# Patient Record
Sex: MALE | Race: WHITE | Employment: FULL TIME | URBAN - METROPOLITAN AREA
[De-identification: names, ages, dates, MRNs, and addresses within clinical notes are randomized per-mention and may not be internally consistent; named-entity substitution may affect disease eponyms.]

---

## 2017-07-18 ENCOUNTER — APPOINTMENT (OUTPATIENT)
Dept: MRI IMAGING | Facility: CLINIC | Age: 52
End: 2017-07-18
Attending: EMERGENCY MEDICINE
Payer: COMMERCIAL

## 2017-07-18 ENCOUNTER — HOSPITAL ENCOUNTER (EMERGENCY)
Facility: CLINIC | Age: 52
Discharge: HOME | End: 2017-07-18
Attending: EMERGENCY MEDICINE | Admitting: EMERGENCY MEDICINE
Payer: COMMERCIAL

## 2017-07-18 ENCOUNTER — APPOINTMENT (OUTPATIENT)
Dept: CT IMAGING | Facility: CLINIC | Age: 52
End: 2017-07-18
Attending: EMERGENCY MEDICINE
Payer: COMMERCIAL

## 2017-07-18 VITALS
RESPIRATION RATE: 12 BRPM | SYSTOLIC BLOOD PRESSURE: 160 MMHG | OXYGEN SATURATION: 97 % | DIASTOLIC BLOOD PRESSURE: 97 MMHG | TEMPERATURE: 98.2 F

## 2017-07-18 DIAGNOSIS — R42 DIZZINESS: ICD-10-CM

## 2017-07-18 DIAGNOSIS — I15.9 SECONDARY HYPERTENSION: ICD-10-CM

## 2017-07-18 LAB
ALBUMIN SERPL-MCNC: 3.8 G/DL (ref 3.4–5)
ALBUMIN UR-MCNC: NEGATIVE MG/DL
ALP SERPL-CCNC: 97 U/L (ref 40–150)
ALT SERPL W P-5'-P-CCNC: 25 U/L (ref 0–70)
ANION GAP SERPL CALCULATED.3IONS-SCNC: 7 MMOL/L (ref 3–14)
APPEARANCE UR: CLEAR
APTT PPP: 26 SEC (ref 22–37)
AST SERPL W P-5'-P-CCNC: 17 U/L (ref 0–45)
BASOPHILS # BLD AUTO: 0.1 10E9/L (ref 0–0.2)
BASOPHILS NFR BLD AUTO: 1 %
BILIRUB SERPL-MCNC: 0.3 MG/DL (ref 0.2–1.3)
BILIRUB UR QL STRIP: NEGATIVE
BUN SERPL-MCNC: 18 MG/DL (ref 7–30)
CALCIUM SERPL-MCNC: 8.7 MG/DL (ref 8.5–10.1)
CHLORIDE SERPL-SCNC: 104 MMOL/L (ref 94–109)
CO2 SERPL-SCNC: 29 MMOL/L (ref 20–32)
COLOR UR AUTO: NORMAL
CREAT SERPL-MCNC: 1.42 MG/DL (ref 0.66–1.25)
DIFFERENTIAL METHOD BLD: NORMAL
EOSINOPHIL # BLD AUTO: 0.2 10E9/L (ref 0–0.7)
EOSINOPHIL NFR BLD AUTO: 3.1 %
ERYTHROCYTE [DISTWIDTH] IN BLOOD BY AUTOMATED COUNT: 12.2 % (ref 10–15)
GFR SERPL CREATININE-BSD FRML MDRD: 52 ML/MIN/1.7M2
GLUCOSE BLDC GLUCOMTR-MCNC: 138 MG/DL (ref 70–99)
GLUCOSE SERPL-MCNC: 115 MG/DL (ref 70–99)
GLUCOSE UR STRIP-MCNC: NEGATIVE MG/DL
HCT VFR BLD AUTO: 41.9 % (ref 40–53)
HGB BLD-MCNC: 14.3 G/DL (ref 13.3–17.7)
HGB UR QL STRIP: NEGATIVE
IMM GRANULOCYTES # BLD: 0 10E9/L (ref 0–0.4)
IMM GRANULOCYTES NFR BLD: 0 %
INR PPP: 0.88 (ref 0.86–1.14)
KETONES UR STRIP-MCNC: NEGATIVE MG/DL
LEUKOCYTE ESTERASE UR QL STRIP: NEGATIVE
LYMPHOCYTES # BLD AUTO: 1.1 10E9/L (ref 0.8–5.3)
LYMPHOCYTES NFR BLD AUTO: 20.8 %
MCH RBC QN AUTO: 29.8 PG (ref 26.5–33)
MCHC RBC AUTO-ENTMCNC: 34.1 G/DL (ref 31.5–36.5)
MCV RBC AUTO: 87 FL (ref 78–100)
MONOCYTES # BLD AUTO: 0.5 10E9/L (ref 0–1.3)
MONOCYTES NFR BLD AUTO: 9.8 %
NEUTROPHILS # BLD AUTO: 3.4 10E9/L (ref 1.6–8.3)
NEUTROPHILS NFR BLD AUTO: 65.3 %
NITRATE UR QL: NEGATIVE
PH UR STRIP: 7 PH (ref 5–7)
PLATELET # BLD AUTO: 155 10E9/L (ref 150–450)
POTASSIUM SERPL-SCNC: 3.7 MMOL/L (ref 3.4–5.3)
PROT SERPL-MCNC: 7.5 G/DL (ref 6.8–8.8)
RBC # BLD AUTO: 4.8 10E12/L (ref 4.4–5.9)
SODIUM SERPL-SCNC: 140 MMOL/L (ref 133–144)
SP GR UR STRIP: 1.01 (ref 1–1.03)
TROPONIN I SERPL-MCNC: NORMAL UG/L (ref 0–0.04)
TSH SERPL DL<=0.005 MIU/L-ACNC: 1.03 MU/L (ref 0.4–4)
URN SPEC COLLECT METH UR: NORMAL
UROBILINOGEN UR STRIP-MCNC: NORMAL MG/DL (ref 0–2)
WBC # BLD AUTO: 5.2 10E9/L (ref 4–11)

## 2017-07-18 PROCEDURE — 99285 EMERGENCY DEPT VISIT HI MDM: CPT | Mod: 25

## 2017-07-18 PROCEDURE — 25000128 H RX IP 250 OP 636: Performed by: EMERGENCY MEDICINE

## 2017-07-18 PROCEDURE — 70498 CT ANGIOGRAPHY NECK: CPT

## 2017-07-18 PROCEDURE — 96361 HYDRATE IV INFUSION ADD-ON: CPT

## 2017-07-18 PROCEDURE — 85730 THROMBOPLASTIN TIME PARTIAL: CPT | Performed by: EMERGENCY MEDICINE

## 2017-07-18 PROCEDURE — 84484 ASSAY OF TROPONIN QUANT: CPT | Performed by: EMERGENCY MEDICINE

## 2017-07-18 PROCEDURE — 70553 MRI BRAIN STEM W/O & W/DYE: CPT

## 2017-07-18 PROCEDURE — 93010 ELECTROCARDIOGRAM REPORT: CPT | Performed by: EMERGENCY MEDICINE

## 2017-07-18 PROCEDURE — 25000131 ZZH RX MED GY IP 250 OP 636 PS 637: Performed by: EMERGENCY MEDICINE

## 2017-07-18 PROCEDURE — 25000125 ZZHC RX 250: Performed by: EMERGENCY MEDICINE

## 2017-07-18 PROCEDURE — 99285 EMERGENCY DEPT VISIT HI MDM: CPT | Mod: 25 | Performed by: EMERGENCY MEDICINE

## 2017-07-18 PROCEDURE — 70450 CT HEAD/BRAIN W/O DYE: CPT | Mod: XS

## 2017-07-18 PROCEDURE — A9585 GADOBUTROL INJECTION: HCPCS | Performed by: EMERGENCY MEDICINE

## 2017-07-18 PROCEDURE — 81003 URINALYSIS AUTO W/O SCOPE: CPT | Performed by: EMERGENCY MEDICINE

## 2017-07-18 PROCEDURE — 84443 ASSAY THYROID STIM HORMONE: CPT | Performed by: EMERGENCY MEDICINE

## 2017-07-18 PROCEDURE — 93005 ELECTROCARDIOGRAM TRACING: CPT

## 2017-07-18 PROCEDURE — 85610 PROTHROMBIN TIME: CPT | Performed by: EMERGENCY MEDICINE

## 2017-07-18 PROCEDURE — 85025 COMPLETE CBC W/AUTO DIFF WBC: CPT | Performed by: EMERGENCY MEDICINE

## 2017-07-18 PROCEDURE — 96360 HYDRATION IV INFUSION INIT: CPT | Mod: 59

## 2017-07-18 PROCEDURE — 00000146 ZZHCL STATISTIC GLUCOSE BY METER IP

## 2017-07-18 PROCEDURE — 80053 COMPREHEN METABOLIC PANEL: CPT | Performed by: EMERGENCY MEDICINE

## 2017-07-18 RX ORDER — MECLIZINE HCL 12.5 MG 12.5 MG/1
25 TABLET ORAL 3 TIMES DAILY PRN
Qty: 30 TABLET | Refills: 0 | Status: SHIPPED | OUTPATIENT
Start: 2017-07-18 | End: 2022-05-04

## 2017-07-18 RX ORDER — DIAZEPAM 10 MG/2ML
2.5 INJECTION, SOLUTION INTRAMUSCULAR; INTRAVENOUS ONCE
Status: DISCONTINUED | OUTPATIENT
Start: 2017-07-18 | End: 2017-07-18

## 2017-07-18 RX ORDER — GADOBUTROL 604.72 MG/ML
0.1 INJECTION INTRAVENOUS ONCE
Status: COMPLETED | OUTPATIENT
Start: 2017-07-18 | End: 2017-07-18

## 2017-07-18 RX ORDER — MECLIZINE HYDROCHLORIDE 25 MG/1
25 TABLET ORAL ONCE
Status: COMPLETED | OUTPATIENT
Start: 2017-07-18 | End: 2017-07-18

## 2017-07-18 RX ORDER — MECLIZINE HCL 12.5 MG 12.5 MG/1
25 TABLET ORAL 3 TIMES DAILY PRN
Qty: 30 TABLET | Refills: 0 | Status: SHIPPED | OUTPATIENT
Start: 2017-07-18 | End: 2017-07-18

## 2017-07-18 RX ORDER — IOPAMIDOL 755 MG/ML
70 INJECTION, SOLUTION INTRAVASCULAR ONCE
Status: COMPLETED | OUTPATIENT
Start: 2017-07-18 | End: 2017-07-18

## 2017-07-18 RX ADMIN — SODIUM CHLORIDE 500 ML: 9 INJECTION, SOLUTION INTRAVENOUS at 15:47

## 2017-07-18 RX ADMIN — GADOBUTROL 8.6 ML: 604.72 INJECTION INTRAVENOUS at 19:00

## 2017-07-18 RX ADMIN — SODIUM CHLORIDE 100 ML: 9 INJECTION, SOLUTION INTRAVENOUS at 15:21

## 2017-07-18 RX ADMIN — MECLIZINE HYDROCHLORIDE 25 MG: 25 TABLET ORAL at 15:45

## 2017-07-18 RX ADMIN — IOPAMIDOL 70 ML: 755 INJECTION, SOLUTION INTRAVENOUS at 15:17

## 2017-07-18 ASSESSMENT — ENCOUNTER SYMPTOMS
NAUSEA: 1
DIZZINESS: 1

## 2017-07-18 NOTE — ED PROVIDER NOTES
"  History     Chief Complaint   Patient presents with     Dizziness     Feeling dizzy today, starting around 1245.  BP very high, pt had been eating DQ.  Pt feeling very tired.       HPI  Don Deluca is a 52 year old male who presents to the ED today for evaluation of dizziness. Patient states at 1245 today he had a sudden onset of dizziness associated with mild nausea. He reports upon standing he feels off balance and upon shutting his eyes while laying down he gets the \"bed spins\". No prior history of dizziness. When he moves his head he also experiences dizziness. Patient denies any numbness or weakness in any extremity. He does not have any visual changes or difficulty speaking. He has not had any chest pain or sob. He denies any  vomiting or abdominal pain.     Social History     Social History     Marital status: N/A     Spouse name: N/A     Number of children: N/A     Years of education: N/A     Occupational History     Not on file.     Social History Main Topics     Smoking status: Not on file     Smokeless tobacco: Not on file     Alcohol use Not on file     Drug use: Not on file     Sexual activity: Not on file     Other Topics Concern     Not on file     Social History Narrative       I have reviewed the Medications, Allergies, Past Medical and Surgical History, and Social History in the Epic system.    Allergies: No Known Allergies      No current facility-administered medications on file prior to encounter.   No current outpatient prescriptions on file prior to encounter.    There is no problem list on file for this patient.      No past surgical history on file.    Social History   Substance Use Topics     Smoking status: Not on file     Smokeless tobacco: Not on file     Alcohol use Not on file         There is no immunization history on file for this patient.    BMI: There is no height or weight on file to calculate BMI.        Review of Systems   Constitutional: Negative for chills and fever. "   HENT: Negative for congestion and trouble swallowing.    Gastrointestinal: Positive for nausea. Negative for abdominal pain and vomiting.   Genitourinary: Negative for decreased urine volume and dysuria.   Musculoskeletal: Positive for gait problem. Negative for back pain and neck pain.   Skin: Negative for rash.   Neurological: Positive for dizziness and light-headedness. Negative for tremors, seizures, syncope, facial asymmetry, speech difficulty, weakness, numbness and headaches.   Hematological: Does not bruise/bleed easily.   Psychiatric/Behavioral: Negative for confusion.   All other systems reviewed and are negative.    Physical Exam   BP: (!) 203/106  Heart Rate: 56  Temp: 98.2  F (36.8  C)  Resp: 16  SpO2: 97 %  Physical Exam   Constitutional: He appears well-developed and well-nourished. No distress.   Psychiatric: He has a normal mood and affect.   Nursing note and vitals reviewed.    HENT: Oral mucosa moist. No lesions. Tm's are clear B.   Eyes: PERRL. EOMI. Positive lateral nystagmus., left greater than right.   Neck: Supple, no posterior neck tenderness.   Pulmonary/Chest: Lungs are clear to auscultation bilaterally.  Cardiovascular: Heart is regular rate and rhythm. No murmur.  Abdomen: Soft, non-distended, non-tender.   Musculoskeletal: Moving all extremities well. No peripheral edema. NO calf tenderness.  Neurological: Alert. No focal neurologic deficit. Cranial nerves intact. No drift in any extremity. GCS 15. Argueta-pike maneuver is positive.   Skin: No rash.    ED Course     ED Course     Procedures             EKG Interpretation:      Interpreted by Don Reed  Rhythm: sinus bradycardia  Rate: Bradycardia  Axis: Normal  Ectopy: none  Conduction: normal  ST Segments/ T Waves: No ST-T wave changes  Q Waves: none  Comparison to prior: No old EKG available    Clinical Impression: sinus bradycardia                  Critical Care time:  none          [employer] Stroke  Scale  Exam Interval: Baseline   Score    Level of consciousness: (0)   Alert, keenly responsive    LOC questions: (0)   Answers both questions correctly    LOC commands: (0)   Performs both tasks correctly    Best gaze: (0)   Normal    Visual: (0)   No visual loss    Facial palsy: (0)   Normal symmetrical movements    Motor arm (left): (0)   No drift    Motor arm (right): (0)   No drift    Motor leg (left): (0)   No drift    Motor leg (right): (0)   No drift    Limb ataxia: (0)   Absent    Sensory: (0)   Normal- no sensory loss    Best language: (0)   Normal- no aphasia    Dysarthria: (0)   Normal    Extinction and inattention: (0)   No abnormality        Total Score:  0          Labs Ordered and Resulted from Time of ED Arrival Up to the Time of Departure from the ED   COMPREHENSIVE METABOLIC PANEL - Abnormal; Notable for the following:        Result Value    Glucose 115 (*)     Creatinine 1.42 (*)     GFR Estimate 52 (*)     All other components within normal limits   GLUCOSE BY METER - Abnormal; Notable for the following:     Glucose 138 (*)     All other components within normal limits   CBC WITH PLATELETS DIFFERENTIAL   INR   PARTIAL THROMBOPLASTIN TIME   TROPONIN I   URINE MACROSCOPIC WITH REFLEX TO MICRO   TSH WITH FREE T4 REFLEX     Results for orders placed or performed during the hospital encounter of 07/18/17   CT Head w/o Contrast    Narrative    CT HEAD W/O CONTRAST  7/18/2017 3:35 PM    HISTORY: Sudden onset of dizziness and nausea.    TECHNIQUE: Scans were obtained through the head without IV contrast.   Radiation dose for this scan was reduced using automated exposure  control, adjustment of the mA and/or kV according to patient size, or  iterative reconstruction technique.    COMPARISON: None.    FINDINGS: No hemorrhage, mass lesion, or focal area of acute  infarction identified. Paranasal sinuses are normal. No bony  abnormality.      Impression    IMPRESSION: Negative CT scan of the  "head.    CHAVA TURNER MD   CT Head Neck Angio w/o & w Contrast    Narrative    CT ANGIOGRAM OF THE HEAD AND NECK WITHOUT AND WITH CONTRAST  7/18/2017  3:36 PM     HISTORY: Sudden onset of dizziness associated with mild nausea. He  reports upon standing he feels off balance and upon shutting his eyes  while laying down he gets the \"bed spins\".     TECHNIQUE: Precontrast localizing scans were followed by CT  angiography with an injection of 70 mL Isovue 370 IV with scans  through the head and neck. Images were transferred to a separate 3-D  workstation where multiplanar reformations and 3-D images were  created. Estimates of carotid stenoses are made relative to the distal  internal carotid artery diameters except as noted. Radiation dose for  this scan was reduced using automated exposure control, adjustment of  the mA and/or kV according to patient size, or iterative  reconstruction technique.    COMPARISON: None.    CT HEAD FINDINGS: No contrast enhancing lesions. Cerebral blood flow  is grossly normal.    CT ANGIOGRAM HEAD FINDINGS: Arteries are widely patent with no  aneurysm, significant stenosis, occlusion or intraarterial thrombus.  Venous circulation is unremarkable.     CT ANGIOGRAM NECK FINDINGS:   Right carotid artery: No significant stenosis.      Left carotid artery: No significant stenosis.      Vertebral arteries: No significant stenosis.      Other findings: Partially cystic 1.2 cm diameter right thyroid nodule.      Impression    IMPRESSION:   1. Normal CT angiogram of the head and neck.  2. Right thyroid nodule. In a patient without known thyroid disease,  an incidental thyroid nodule without microcalcifications measuring  <1.0 cm in size in a patient <35 years old or <1.5 cm in a patient >35  years old is most likely benign and does not typically require  follow-up.    I agree with the preliminary report that was communicated to the  referring physician.    CHRISTIAN NARAYAN MD   MR Brain w/o & w " Contrast    Narrative    MRI BRAIN WITHOUT AND WITH CONTRAST  7/18/2017 7:01 PM    HISTORY: Lightheadedness, dizziness and ataxia. Hypertension. Symptoms  began at 12:30 PM today.     TECHNIQUE: Multiplanar, multisequence MRI of the brain without and  with 8.6 mL Gadavist.    COMPARISON: CT angiogram today.    FINDINGS: There are a few tiny foci of prolonged T2 relaxation in the  central and subcortical white matter of the posterior frontal lobes,  nonspecific as to etiology. Ventricles and subarachnoid spaces appear  normal. There is no evidence of hemorrhage, mass, acute infarct, or  anomaly. There are no gadolinium enhancing lesions.    The facial structures appear normal. The arteries at the base of the  brain and the dural venous sinuses appear patent.       Impression    IMPRESSION:  1. No evidence of acute infarct, hemorrhage or mass.  2. Nonspecific supratentorial white matter lesions mainly in the  posterior frontal lobes.      CHRISTIAN NARAYAN MD         Medications   0.9% sodium chloride BOLUS (0 mLs Intravenous Stopped 7/18/17 1819)   iopamidol (ISOVUE-370) solution 70 mL (70 mLs Intravenous Given 7/18/17 1517)   sodium chloride 0.9 % for CT scan flush dose 100 mL (100 mLs As instructed Given 7/18/17 1521)   meclizine (ANTIVERT) tablet 25 mg (25 mg Oral Given 7/18/17 1545)   gadobutrol (GADAVIST) injection 0.1 mL/kg (8.6 mLs Intravenous Given 7/18/17 1900)       2:49 PM Patient assessed.     Assessments & Plan (with Medical Decision Making)I was asked to evaluate for a code stroke , Patient's NIHSS score is 0 and this appears to be a positional vertigo so a code stroke was not called. A CT/CTA was obtained after patient received a fluid bolus . Labs and an ekg were obtained. Labs were without acute abnormality accept an increased creatinine which was baseline for the patient. Patient was given meclizine for his dizziness. CT/CTA were unremarkable accept a small thyroid nodule. Findings were discussed with  patient. His blood  Pressure was significantly elevated when he arrived but this has been gradually improving. Due to his mild previous ataxia I discussed MRI of the brain with the patient and he was in agreement with the plan. MRI was without significant abnormality. With nonspecific supratentorial white matter lesions mainly in the posterior frontal lobes which in discussion with the radiologist were insignificant. Patient was able to ambulate without difficulty . He feels comfortable going home at this time. He did not have any arrhythmias while in the ED. He will follow up with his primary tomorrow to have his BP rechecked and will return if symptoms worsen or new symptoms develop. I have considered both neurogenic and cardiogenic causes of dizziness.      I have reviewed the nursing notes.    I have reviewed the findings, diagnosis, plan and need for follow up with the patient.       Discharge Medication List as of 7/18/2017  8:16 PM      START taking these medications    Details   meclizine (ANTIVERT) 12.5 MG tablet Take 2 tablets (25 mg) by mouth 3 times daily as needed for dizziness, Disp-30 tablet, R-0, E-Prescribe             Final diagnoses:   Dizziness - possible vertigo   Secondary hypertension     This document serves as a record of the services and decisions personally performed and made by Don Reed MD. It was created on HIS/HER behalf by Ruby Jackson, a trained medical scribe. The creation of this document is based the provider's statements to the medical scribe.  Ruby Jackson 2:55 PM 7/18/2017    Provider:   The information in this document, created by the medical scribe for me, accurately reflects the services I personally performed and the decisions made by me. I have reviewed and approved this document for accuracy prior to leaving the patient care area.  Don Reed MD 2:55 PM 7/18/2017 7/18/2017   [facility] EMERGENCY DEPARTMENT     Don Reed,  MD  07/20/17 7582

## 2017-07-18 NOTE — ED AVS SNAPSHOT
[facility] Emergency Department    [address] [address]    Phone:  [phone]    Fax:  [phone]                                       Don Deluca   MRN: [id number]    Department:  [facility] Emergency Department   Date of Visit:  7/18/2017           After Visit Summary Signature Page     I have received my discharge instructions, and my questions have been answered. I have discussed any challenges I see with this plan with the nurse or doctor.    ..........................................................................................................................................  Patient/Patient Representative Signature      ..........................................................................................................................................  Patient Representative Print Name and Relationship to Patient    ..................................................               ................................................  Date                                            Time    ..........................................................................................................................................  Reviewed by Signature/Title    ...................................................              ..............................................  Date                                                            Time

## 2017-07-18 NOTE — LETTER
[facility] EMERGENCY DEPARTMENT  [address] [address]  [phone]    2017    Don Deluca  [address] [place] [address]  [phone] (home)     : 1965      To Whom it may concern:    Don Sandrine was seen in our Emergency Department today, 2017.  He should return to work on 2017.    Sincerely,        Don Reed

## 2017-07-18 NOTE — ED NOTES
"Discussed plan of care with pt, he does not want to \" wait around for MRI \" will inform Dr Reed of this.  "

## 2017-07-18 NOTE — ED NOTES
Pt presents to the ER w c/o dizziness, onset after lunch today.  It is worse with movement.  Pt denies HA and vision changes.  He has no weakness nor deficits and [place] stroke scale is negative.  Pt reports no CHI and is not on blood thinners.  He is nauseated with movement.     Pt reports he is on HCTZ for HTN but does not take it regularly.  He took med last night but missed several nights prior to that.

## 2017-07-18 NOTE — ED AVS SNAPSHOT
[facility] Emergency Department    [address] [facility] [address]    Phone:  [phone]    Fax:  [phone]                                       Don Deluca   MRN: [id number]    Department:  [facility] Department   Date of Visit:  7/18/2017           Patient Information     Date Of Birth          1965        Your diagnoses for this visit were:     Dizziness possible vertigo    Secondary hypertension        You were seen by Don Reed MD.      Follow-up Information     Follow up with Your primary care.    Why:  Tomorrow for recheck of your hypertension.        Follow up with [facility] Emergency [facility].    Specialty:  EMERGENCY MEDICINE    Why:  If symptoms worsen    Contact information:    [address] [place] [id number] [phone]    Additional information:    The medical center is located at   [address] [facility]. (between [address] and   [address] in [place], [place] miles north   of [place]).        Discharge Instructions         Return if symptoms worsen or new symptoms develop.  Follow up with her primary care physician for recheck of your blood pressure tomorrow.  Continue blood pressure medications.  Take meclizine as directed.  If any further dizziness weakness numbness visual changes or other symptoms present please return for recheck.  Dizziness (Vertigo) and Balance Problems: Diagnostic Tests    An otolaryngologist (also called an ENT) is a doctor who specializes in disorders of the ear, nose, and throat. Your ENT can help find clues to the cause of your dizziness. He or she will examine you and go over your health history. Your ENT may also order certain tests to help diagnose your problem.  Hearing testing  In most cases, you will be referred for hearing testing. This is because the nerve that sends balance signals also sends hearing signals. A problem that affects balance can also affect hearing.  Other tests  Your  doctor may recommend more than one kind of test. The following tests are painless, but may cause dizziness in some cases.    MRI creates images of the ear or head. A magnetic field and contrast medium are used to make the image.    Electronystagmography (ENG) records eye movement. Small electrodes are put on the skin around your eyes. Then your ear is filled with warm or cold water.    Rotation tests show the relationship between the inner ear and your eyes. You may be asked to wear special goggles or sit in a computerized chair.    Posturography tests your standing balance under different conditions. You will stand on a platform that measures shifts in your body weight.     Electrocochleography (ECoG) measures the fluid pressure in the inner ear. An abnormal ECoG may mean you have Meniere's disease or other conditions.    Vestibular evoked myogenic potentials (VEMPs) may be used if your healthcare provider suspects a rare condition like superior semicircular canal dehiscence. Electrodes are placed on your neck, and you hear clicks in your ear.  Date Last Reviewed: 11/1/2016 2000-2017 [employer]. [address] [place] [address]. All rights reserved. This information is not intended as a substitute for professional medical care. Always follow your healthcare professional's instructions.          Dizziness (Vertigo) and Balance Problems: Staying Safe     Replace burned-out lightbulbs to keep your home safe and well lit.   Falls or accidents can lead to pain, broken bones, and fear of future falls. Protect yourself and others by preparing for episodes. Simple steps can help you stay safe at home and wherever you go.  Lighting  Keep all areas well lit. This helps your eyes send the right signals to the brain. It also makes you less likely to trip and fall. If bright lights make symptoms worse, dim the lights or lie in a dark room until the dizziness passes. Then turn the lights back to their  normal level.  Tips:    Keep a flashlight by the bed.    Place nightlights in bathrooms and hallways.    Replace burned-out bulbs, or have someone replace them for you.  Preventing falls  To reduce your risk of falling:    Get out of bed or up from a chair slowly.    Wear low-heeled shoes that fit properly and have slip-resistant soles.    Remove throw rugs. Clear clutter from walkways.    Use handrails on stairs. Have handrails installed or adjusted if needed.    Install grab bars in the bathroom. Don't use towel racks for balance.    Use a shower stool. Also put adhesive strips in the shower or on the tub floor.  Going out  With a little time and preparation, you can get around safely.  Tips:    Bring a cane or walking aid if needed.    Give yourself plenty of time in case you start to get dizzy.    Ask your healthcare provider what type of exercise is safe for your condition.    Be patient. If an activity such as walking through a crowded shop causes you stress, you may not be ready for it yet.  Driving  If you become dizzy or disoriented while driving, you could hurt yourself and others. That's why it's best to not drive until symptoms have gone away. In some cases, your license may be temporarily held until it's safe for you to drive again.  For safety:    Ask a friend to drive for you.    Take public transportation.    Walk to stores and other places when you can.  Asking for help  Don't be afraid to ask for help running errands, cooking meals, and doing exercise. Whether it's a friend, loved one, neighbor, or stranger on the street, a little help can make a world of difference.   Date Last Reviewed: 11/1/2016 2000-2017 [employer]. [address], [place] [address]. All rights reserved. This information is not intended as a substitute for professional medical care. Always follow your healthcare professional's instructions.          24 Hour Appointment Hotline       To make an  appointment at any [facility], call [phone] ([phone]). If you don't have a family doctor or clinic, we will help you find one. [facility] are conveniently located to serve the needs of you and your family.             Review of your medicines      START taking        Dose / Directions Last dose taken    meclizine 12.5 MG tablet   Commonly known as:  ANTIVERT   Dose:  25 mg   Quantity:  30 tablet        Take 2 tablets (25 mg) by mouth 3 times daily as needed for dizziness   Refills:  0          Our records show that you are taking the medicines listed below. If these are incorrect, please call your family doctor or clinic.        Dose / Directions Last dose taken    HYDROCHLOROTHIAZIDE PO        Take by mouth daily   Refills:  0        SERTRALINE HCL PO   Dose:  150 mg        Take 150 mg by mouth every evening   Refills:  0                Prescriptions were sent or printed at these locations (1 Prescription)                   [id number] - [place], [place] - [address] [facility],    [address] [facility], , [facility] [address]    Telephone:  [phone]   Fax:  [phone]   Hours:                  E-Prescribed (1 of 1)         meclizine (ANTIVERT) 12.5 MG tablet                Procedures and tests performed during your visit     CBC with platelets differential    CT Head Neck Angio w/o & w Contrast    CT Head w/o Contrast    Comprehensive metabolic panel    EKG 12 lead    Glucose by meter    INR    MR Brain w/o & w Contrast    Partial thromboplastin time    TSH with free T4 reflex    Troponin I    UA reflex to Microscopic      Orders Needing Specimen Collection     None      Pending Results     No orders found from 7/16/2017 to 7/19/2017.            Pending Culture Results     No orders found from 7/16/2017 to 7/19/2017.            Pending Results Instructions     If you had any lab results that were not finalized at the time of your Discharge, you can call the ED Lab Result RN  "at [phone]. You will be contacted by this team for any positive Lab results or changes in treatment. The nurses are available 7 days a week from 10A to 6:30P.  You can leave a message 24 hours per day and they will return your call.        Test Results From Your Hospital Stay        7/18/2017  3:47 PM      Narrative     CT HEAD W/O CONTRAST  7/18/2017 3:35 PM    HISTORY: Sudden onset of dizziness and nausea.    TECHNIQUE: Scans were obtained through the head without IV contrast.   Radiation dose for this scan was reduced using automated exposure  control, adjustment of the mA and/or kV according to patient size, or  iterative reconstruction technique.    COMPARISON: None.    FINDINGS: No hemorrhage, mass lesion, or focal area of acute  infarction identified. Paranasal sinuses are normal. No bony  abnormality.        Impression     IMPRESSION: Negative CT scan of the head.    CHAVA TURNER MD         7/18/2017  8:11 PM      Narrative     CT ANGIOGRAM OF THE HEAD AND NECK WITHOUT AND WITH CONTRAST  7/18/2017  3:36 PM     HISTORY: Sudden onset of dizziness associated with mild nausea. He  reports upon standing he feels off balance and upon shutting his eyes  while laying down he gets the \"bed spins\".     TECHNIQUE: Precontrast localizing scans were followed by CT  angiography with an injection of 70 mL Isovue 370 IV with scans  through the head and neck. Images were transferred to a separate 3-D  workstation where multiplanar reformations and 3-D images were  created. Estimates of carotid stenoses are made relative to the distal  internal carotid artery diameters except as noted. Radiation dose for  this scan was reduced using automated exposure control, adjustment of  the mA and/or kV according to patient size, or iterative  reconstruction technique.    COMPARISON: None.    CT HEAD FINDINGS: No contrast enhancing lesions. Cerebral blood flow  is grossly normal.    CT ANGIOGRAM HEAD FINDINGS: Arteries are " widely patent with no  aneurysm, significant stenosis, occlusion or intraarterial thrombus.  Venous circulation is unremarkable.     CT ANGIOGRAM NECK FINDINGS:   Right carotid artery: No significant stenosis.      Left carotid artery: No significant stenosis.      Vertebral arteries: No significant stenosis.      Other findings: Partially cystic 1.2 cm diameter right thyroid nodule.        Impression     IMPRESSION:   1. Normal CT angiogram of the head and neck.  2. Right thyroid nodule. In a patient without known thyroid disease,  an incidental thyroid nodule without microcalcifications measuring  <1.0 cm in size in a patient <35 years old or <1.5 cm in a patient >35  years old is most likely benign and does not typically require  follow-up.    I agree with the preliminary report that was communicated to the  referring physician.    CHRISTIAN NARAYAN MD         7/18/2017  3:44 PM      Component Results     Component Value Ref Range & Units Status    WBC 5.2 4.0 - 11.0 10e9/L Final    RBC Count 4.80 4.4 - 5.9 10e12/L Final    Hemoglobin 14.3 13.3 - 17.7 g/dL Final    Hematocrit 41.9 40.0 - 53.0 % Final    MCV 87 78 - 100 fl Final    MCH 29.8 26.5 - 33.0 pg Final    MCHC 34.1 31.5 - 36.5 g/dL Final    RDW 12.2 10.0 - 15.0 % Final    Platelet Count 155 150 - 450 10e9/L Final    Diff Method Automated Method  Final    % Neutrophils 65.3 % Final    % Lymphocytes 20.8 % Final    % Monocytes 9.8 % Final    % Eosinophils 3.1 % Final    % Basophils 1.0 % Final    % Immature Granulocytes 0.0 % Final    Absolute Neutrophil 3.4 1.6 - 8.3 10e9/L Final    Absolute Lymphocytes 1.1 0.8 - 5.3 10e9/L Final    Absolute Monocytes 0.5 0.0 - 1.3 10e9/L Final    Absolute Eosinophils 0.2 0.0 - 0.7 10e9/L Final    Absolute Basophils 0.1 0.0 - 0.2 10e9/L Final    Abs Immature Granulocytes 0.0 0 - 0.4 10e9/L Final         7/18/2017  3:57 PM      Component Results     Component Value Ref Range & Units Status    Sodium 140 133 - 144 mmol/L Final     Potassium 3.7 3.4 - 5.3 mmol/L Final    Chloride 104 94 - 109 mmol/L Final    Carbon Dioxide 29 20 - 32 mmol/L Final    Anion Gap 7 3 - 14 mmol/L Final    Glucose 115 (H) 70 - 99 mg/dL Final    Urea Nitrogen 18 7 - 30 mg/dL Final    Creatinine 1.42 (H) 0.66 - 1.25 mg/dL Final    GFR Estimate 52 (L) >60 mL/min/1.7m2 Final    Non  GFR Calc    GFR Estimate If Black 63 >60 mL/min/1.7m2 Final    African American GFR Calc    Calcium 8.7 8.5 - 10.1 mg/dL Final    Bilirubin Total 0.3 0.2 - 1.3 mg/dL Final    Albumin 3.8 3.4 - 5.0 g/dL Final    Protein Total 7.5 6.8 - 8.8 g/dL Final    Alkaline Phosphatase 97 40 - 150 U/L Final    ALT 25 0 - 70 U/L Final    AST 17 0 - 45 U/L Final         7/18/2017  4:34 PM      Component Results     Component Value Ref Range & Units Status    INR 0.88 0.86 - 1.14 Final         7/18/2017  4:34 PM      Component Results     Component Value Ref Range & Units Status    PTT 26 22 - 37 sec Final         7/18/2017  3:57 PM      Component Results     Component Value Ref Range & Units Status    Troponin I ES  0.000 - 0.045 ug/L Final    <0.015  The 99th percentile for upper reference range is 0.045 ug/L.  Troponin values in   the range of 0.045 - 0.120 ug/L may be associated with risks of adverse   clinical events.           7/18/2017  5:14 PM      Component Results     Component Value Ref Range & Units Status    Color Urine Straw  Final    Appearance Urine Clear  Final    Glucose Urine Negative NEG mg/dL Final    Bilirubin Urine Negative NEG Final    Ketones Urine Negative NEG mg/dL Final    Specific Gravity Urine 1.012 1.003 - 1.035 Final    Blood Urine Negative NEG Final    pH Urine 7.0 5.0 - 7.0 pH Final    Protein Albumin Urine Negative NEG mg/dL Final    Urobilinogen mg/dL Normal 0.0 - 2.0 mg/dL Final    Nitrite Urine Negative NEG Final    Leukocyte Esterase Urine Negative NEG Final    Source Midstream Urine  Final         7/18/2017  3:55 PM      Component Results      "Component Value Ref Range & Units Status    Glucose 138 (H) 70 - 99 mg/dL Final         7/18/2017  5:11 PM      Component Results     Component Value Ref Range & Units Status    TSH 1.03 0.40 - 4.00 mU/L Final         7/18/2017  8:10 PM      Narrative     MRI BRAIN WITHOUT AND WITH CONTRAST  7/18/2017 7:01 PM    HISTORY: Lightheadedness, dizziness and ataxia. Hypertension. Symptoms  began at 12:30 PM today.     TECHNIQUE: Multiplanar, multisequence MRI of the brain without and  with 8.6 mL Gadavist.    COMPARISON: CT angiogram today.    FINDINGS: There are a few tiny foci of prolonged T2 relaxation in the  central and subcortical white matter of the posterior frontal lobes,  nonspecific as to etiology. Ventricles and subarachnoid spaces appear  normal. There is no evidence of hemorrhage, mass, acute infarct, or  anomaly. There are no gadolinium enhancing lesions.    The facial structures appear normal. The arteries at the base of the  brain and the dural venous sinuses appear patent.         Impression     IMPRESSION:  1. No evidence of acute infarct, hemorrhage or mass.  2. Nonspecific supratentorial white matter lesions mainly in the  posterior frontal lobes.      CHRISTIAN NARAYAN MD                Thank you for choosing [facility]       Thank you for choosing [facility] for your care. Our goal is always to provide you with excellent care. Hearing back from our patients is one way we can continue to improve our services. Please take a few minutes to complete the written survey that you may receive in the mail after you visit with us. Thank you!        [email or web address] Information     [employer] lets you send messages to your doctor, view your test results, renew your prescriptions, schedule appointments and more. To sign up, go to [email or web address] . Click on \"Log in\" on the left side of the screen, which will take you to the Welcome page. Then click on \"Sign up Now\" on the right side of the page.     You will be asked to " enter the access code listed below, as well as some personal information. Please follow the directions to create your username and password.     Your access code is: [email or web address]  Expires: 10/16/2017  8:16 PM     Your access code will  in 90 days. If you need help or a new code, please call your [facility] clinic or [phone].        Care EveryWhere ID     This is your Care EveryWhere ID. This could be used by other organizations to access your [facility] medical records  [id number]        Equal Access to Services     [facility]: Haduna Yee, jessica carr, beronica kahnaldavid lindsey, nito woodson . So [facility] [phone].    ATENCIÓN: Si habla español, tiene a salvador disposición servicios gratuitos de asistencia lingüística. Llame al [phone].    We comply with applicable federal civil rights laws and [place] laws. We do not discriminate on the basis of race, color, national origin, age, disability sex, sexual orientation or gender identity.            After Visit Summary       This is your record. Keep this with you and show to your community pharmacist(s) and doctor(s) at your next visit.

## 2017-07-19 NOTE — DISCHARGE INSTRUCTIONS
Return if symptoms worsen or new symptoms develop.  Follow up with her primary care physician for recheck of your blood pressure tomorrow.  Continue blood pressure medications.  Take meclizine as directed.  If any further dizziness weakness numbness visual changes or other symptoms present please return for recheck.  Dizziness (Vertigo) and Balance Problems: Diagnostic Tests    An otolaryngologist (also called an ENT) is a doctor who specializes in disorders of the ear, nose, and throat. Your ENT can help find clues to the cause of your dizziness. He or she will examine you and go over your health history. Your ENT may also order certain tests to help diagnose your problem.  Hearing testing  In most cases, you will be referred for hearing testing. This is because the nerve that sends balance signals also sends hearing signals. A problem that affects balance can also affect hearing.  Other tests  Your doctor may recommend more than one kind of test. The following tests are painless, but may cause dizziness in some cases.    MRI creates images of the ear or head. A magnetic field and contrast medium are used to make the image.    Electronystagmography (ENG) records eye movement. Small electrodes are put on the skin around your eyes. Then your ear is filled with warm or cold water.    Rotation tests show the relationship between the inner ear and your eyes. You may be asked to wear special goggles or sit in a computerized chair.    Posturography tests your standing balance under different conditions. You will stand on a platform that measures shifts in your body weight.     Electrocochleography (ECoG) measures the fluid pressure in the inner ear. An abnormal ECoG may mean you have Meniere's disease or other conditions.    Vestibular evoked myogenic potentials (VEMPs) may be used if your healthcare provider suspects a rare condition like superior semicircular canal dehiscence. Electrodes are placed on your neck, and  you hear clicks in your ear.  Date Last Reviewed: 11/1/2016 2000-2017 [employer]. [address] [place] [address], [place] [address]. All rights reserved. This information is not intended as a substitute for professional medical care. Always follow your healthcare professional's instructions.          Dizziness (Vertigo) and Balance Problems: Staying Safe     Replace burned-out lightbulbs to keep your home safe and well lit.   Falls or accidents can lead to pain, broken bones, and fear of future falls. Protect yourself and others by preparing for episodes. Simple steps can help you stay safe at home and wherever you go.  Lighting  Keep all areas well lit. This helps your eyes send the right signals to the brain. It also makes you less likely to trip and fall. If bright lights make symptoms worse, dim the lights or lie in a dark room until the dizziness passes. Then turn the lights back to their normal level.  Tips:    Keep a flashlight by the bed.    Place nightlights in bathrooms and hallways.    Replace burned-out bulbs, or have someone replace them for you.  Preventing falls  To reduce your risk of falling:    Get out of bed or up from a chair slowly.    Wear low-heeled shoes that fit properly and have slip-resistant soles.    Remove throw rugs. Clear clutter from walkways.    Use handrails on stairs. Have handrails installed or adjusted if needed.    Install grab bars in the bathroom. Don't use towel racks for balance.    Use a shower stool. Also put adhesive strips in the shower or on the tub floor.  Going out  With a little time and preparation, you can get around safely.  Tips:    Bring a cane or walking aid if needed.    Give yourself plenty of time in case you start to get dizzy.    Ask your healthcare provider what type of exercise is safe for your condition.    Be patient. If an activity such as walking through a crowded shop causes you stress, you may not be ready for it yet.  Driving  If you  become dizzy or disoriented while driving, you could hurt yourself and others. That's why it's best to not drive until symptoms have gone away. In some cases, your license may be temporarily held until it's safe for you to drive again.  For safety:    Ask a friend to drive for you.    Take public transportation.    Walk to stores and other places when you can.  Asking for help  Don't be afraid to ask for help running errands, cooking meals, and doing exercise. Whether it's a friend, loved one, neighbor, or stranger on the street, a little help can make a world of difference.   Date Last Reviewed: 11/1/2016 2000-2017 [employer]. [address] [place] [address], [place] [address]. All rights reserved. This information is not intended as a substitute for professional medical care. Always follow your healthcare professional's instructions.

## 2017-07-20 ASSESSMENT — ENCOUNTER SYMPTOMS
FEVER: 0
ABDOMINAL PAIN: 0
SEIZURES: 0
BACK PAIN: 0
DYSURIA: 0
CONFUSION: 0
WEAKNESS: 0
FACIAL ASYMMETRY: 0
NECK PAIN: 0
LIGHT-HEADEDNESS: 1
BRUISES/BLEEDS EASILY: 0
TROUBLE SWALLOWING: 0
TREMORS: 0
HEADACHES: 0
SPEECH DIFFICULTY: 0
NUMBNESS: 0
CHILLS: 0
VOMITING: 0

## 2022-03-09 ENCOUNTER — TRANSFERRED RECORDS (OUTPATIENT)
Dept: HEALTH INFORMATION MANAGEMENT | Facility: CLINIC | Age: 57
End: 2022-03-09
Payer: COMMERCIAL

## 2022-05-04 ENCOUNTER — OFFICE VISIT (OUTPATIENT)
Dept: OTOLARYNGOLOGY | Facility: CLINIC | Age: 57
End: 2022-05-04
Payer: COMMERCIAL

## 2022-05-04 VITALS
HEIGHT: 70 IN | HEART RATE: 54 BPM | SYSTOLIC BLOOD PRESSURE: 149 MMHG | BODY MASS INDEX: 25.77 KG/M2 | DIASTOLIC BLOOD PRESSURE: 92 MMHG | TEMPERATURE: 97.6 F | WEIGHT: 180 LBS

## 2022-05-04 DIAGNOSIS — G47.33 MODERATE OBSTRUCTIVE SLEEP APNEA: Primary | ICD-10-CM

## 2022-05-04 DIAGNOSIS — Z78.9 INTOLERANCE OF CONTINUOUS POSITIVE AIRWAY PRESSURE (CPAP) VENTILATION: ICD-10-CM

## 2022-05-04 PROCEDURE — 99204 OFFICE O/P NEW MOD 45 MIN: CPT | Performed by: OTOLARYNGOLOGY

## 2022-05-04 RX ORDER — AMLODIPINE BESYLATE 10 MG/1
10 TABLET ORAL DAILY
COMMUNITY
End: 2022-06-27

## 2022-05-04 RX ORDER — BUSPIRONE HYDROCHLORIDE 10 MG/1
15 TABLET ORAL DAILY
COMMUNITY

## 2022-05-04 NOTE — PROGRESS NOTES
Chief Complaint   Patient presents with     Consult     Inspire     History of Present Illness   Don Deluca is a 57 year old male who presents today for evaluation.  I am seeing this patient in consultation for obstructive sleep apnea at the request of the provider Dr. Roberto. The patient describes symptoms of poor, unrestful sleep, with snoring for the past several years.  He does report some intermittent nasal obstruction/congestion.  He has not had previous nose or sinus surgery or tonsillectomy.     The patient underwent an outside sleep study on 3/9/2022 which showed an overall AHI of 25.6 events per hour.  Supine AHI was 42.2 events per hour, nonsupine AHI was 11.4 events per hour.  The patient's BMI the time of the study was 25.92 kg/m     Patient has tried CPAP with multiple facemasks including 2 trials of a full facemask, nasal mask.  He has not tried MAD treatment.  He has trouble with wearing the CPAP at night due to the CPAP leaking, getting coiled up in his tubing, changing position multiple times at night.  The CPAP tends to keep him up if he wakes up at night.  He wakes up feeling not well rested and has trouble with daytime somnolence even with CPAP use.  Patient's current BMI is 25.83 kg/m .    Past Medical History  There is no problem list on file for this patient.    Current Medications     Current Outpatient Medications:      amLODIPine (NORVASC) 10 MG tablet, Take 10 mg by mouth daily, Disp: , Rfl:      busPIRone (BUSPAR) 10 MG tablet, Take 10 mg by mouth 3 times daily, Disp: , Rfl:      HYDROCHLOROTHIAZIDE PO, Take by mouth daily, Disp: , Rfl:      SERTRALINE HCL PO, Take 150 mg by mouth every evening, Disp: , Rfl:     Allergies  No Known Allergies    Social History   Social History     Socioeconomic History     Marital status: Single       Family History  No family history on file.    Review of Systems  As per HPI and PMHx, otherwise 10+ comprehensive system review is  "negative.    Physical Exam  BP (!) 149/92 (BP Location: Right arm, Patient Position: Sitting, Cuff Size: Adult Large)   Pulse 54   Temp 97.6  F (36.4  C) (Tympanic)   Ht 1.778 m (5' 10\")   Wt 81.6 kg (180 lb)   BMI 25.83 kg/m    GENERAL: Patient is a pleasant, cooperative 57 year old male in no acute distress.  HEAD: Normocephalic, atraumatic.  Hair and scalp are normal.  EYES: Pupils are equal, round, reactive to light and accommodation.  Extraocular movements are intact.  The sclera nonicteric without injection.  The extraocular structures are normal.  EARS: Normal shape and symmetry.  No tenderness when palpating the mastoid or tragal areas bilaterally.    NOSE: Nares are patent.  Nasal mucosa is pink and moist.  Negative anterior rhinoscopy.  ORAL CAVITY: Dentition is in good repair.  Mucous membranes are moist.  Tongue is mobile, protrudes to the midline.  Palate elevates symmetrically.  Tonsils are 1+, symmetric.  No erythema or exudate.  No oral cavity or oropharyngeal masses, lesions, ulcerations, leukoplakia.  The patient has a Fraire tongue/palate position grade 4.  NECK: Supple, trachea is midline.  The patient has 3 fingerbreadths of hyomental distance.  There no palpable cervical lymphadenopathy or masses bilaterally.    NEUROLOGIC: Cranial nerves II through XII are grossly intact.  Voice is strong.  Patient is House-Brackmann I/VI bilaterally.  CARDIOVASCULAR: Regular rate and rhythm.  Normal S1 and S2.  No murmurs, gallops, or rubs.  Extremities are warm and well-perfused.  No significant peripheral edema.  RESPIRATORY: Lungs are clear to auscultation in the anterior and posterior chest fields.  No wheezes, rales, or rhonchi.  Patient has nonlabored breathing without cough, wheeze, stridor.  PSYCHIATRIC: Patient is alert and oriented.  Mood and affect appear normal.  SKIN: Warm and dry.  No scalp, face, or neck lesions noted.    Assessment and Plan     ICD-10-CM    1. Moderate obstructive sleep " apnea  G47.33 Case Request: DRUG INDUCED SLEEP ENDOSCOPY   2. Intolerance of continuous positive airway pressure (CPAP) ventilation  Z78.9 Case Request: DRUG INDUCED SLEEP ENDOSCOPY   3. BMI 25.0-25.9,adult  Z68.25 Case Request: DRUG INDUCED SLEEP ENDOSCOPY     It was my pleasure seeing Don Deluca today in clinic.  The patient presents to clinic today with moderate obstructive sleep apnea intolerant to CPAP.  The patient's BMI is 25.83 kg/m .  They are interested in the hypoglossal nerve stimulator.  We discussed placement of the hypoglossal nerve stimulator including postoperative course, activation, need for battery change, limitation of the chest/abdomen/pelvis MRI, need for alteration of airport screening.  We discussed the need of drug induced sleep endoscopy to ensure candidacy.  The patient heart and lung exam today is normal.  The patient is appropriate anesthetic and surgical risk for the above-stated procedure.     We discussed the risks, benefits, alternatives, options of drug-induced sleep endoscopy including, but not, limited to: risk of general anesthesia, potential need for additional procedures.  We discussed the postoperative course and convalescence and need for  the day of the procedure.  The patient voiced understanding and is willing to proceed.    Don to follow up with Primary Care provider regarding elevated blood pressure.    Oscar Ma MD  Department of Otolaryngology-Head and Neck Surgery  [facility]

## 2022-05-04 NOTE — H&P (VIEW-ONLY)
Chief Complaint   Patient presents with     Consult     Inspire     History of Present Illness   Don Deluca is a 57 year old male who presents today for evaluation.  I am seeing this patient in consultation for obstructive sleep apnea at the request of the provider Dr. Roberto. The patient describes symptoms of poor, unrestful sleep, with snoring for the past several years.  He does report some intermittent nasal obstruction/congestion.  He has not had previous nose or sinus surgery or tonsillectomy.     The patient underwent an outside sleep study on 3/9/2022 which showed an overall AHI of 25.6 events per hour.  Supine AHI was 42.2 events per hour, nonsupine AHI was 11.4 events per hour.  The patient's BMI the time of the study was 25.92 kg/m     Patient has tried CPAP with multiple facemasks including 2 trials of a full facemask, nasal mask.  He has not tried MAD treatment.  He has trouble with wearing the CPAP at night due to the CPAP leaking, getting coiled up in his tubing, changing position multiple times at night.  The CPAP tends to keep him up if he wakes up at night.  He wakes up feeling not well rested and has trouble with daytime somnolence even with CPAP use.  Patient's current BMI is 25.83 kg/m .    Past Medical History  There is no problem list on file for this patient.    Current Medications     Current Outpatient Medications:      amLODIPine (NORVASC) 10 MG tablet, Take 10 mg by mouth daily, Disp: , Rfl:      busPIRone (BUSPAR) 10 MG tablet, Take 10 mg by mouth 3 times daily, Disp: , Rfl:      HYDROCHLOROTHIAZIDE PO, Take by mouth daily, Disp: , Rfl:      SERTRALINE HCL PO, Take 150 mg by mouth every evening, Disp: , Rfl:     Allergies  No Known Allergies    Social History   Social History     Socioeconomic History     Marital status: Single       Family History  No family history on file.    Review of Systems  As per HPI and PMHx, otherwise 10+ comprehensive system review is  "negative.    Physical Exam  BP (!) 149/92 (BP Location: Right arm, Patient Position: Sitting, Cuff Size: Adult Large)   Pulse 54   Temp 97.6  F (36.4  C) (Tympanic)   Ht 1.778 m (5' 10\")   Wt 81.6 kg (180 lb)   BMI 25.83 kg/m    GENERAL: Patient is a pleasant, cooperative 57 year old male in no acute distress.  HEAD: Normocephalic, atraumatic.  Hair and scalp are normal.  EYES: Pupils are equal, round, reactive to light and accommodation.  Extraocular movements are intact.  The sclera nonicteric without injection.  The extraocular structures are normal.  EARS: Normal shape and symmetry.  No tenderness when palpating the mastoid or tragal areas bilaterally.    NOSE: Nares are patent.  Nasal mucosa is pink and moist.  Negative anterior rhinoscopy.  ORAL CAVITY: Dentition is in good repair.  Mucous membranes are moist.  Tongue is mobile, protrudes to the midline.  Palate elevates symmetrically.  Tonsils are 1+, symmetric.  No erythema or exudate.  No oral cavity or oropharyngeal masses, lesions, ulcerations, leukoplakia.  The patient has a Fraire tongue/palate position grade 4.  NECK: Supple, trachea is midline.  The patient has 3 fingerbreadths of hyomental distance.  There no palpable cervical lymphadenopathy or masses bilaterally.    NEUROLOGIC: Cranial nerves II through XII are grossly intact.  Voice is strong.  Patient is House-Brackmann I/VI bilaterally.  CARDIOVASCULAR: Regular rate and rhythm.  Normal S1 and S2.  No murmurs, gallops, or rubs.  Extremities are warm and well-perfused.  No significant peripheral edema.  RESPIRATORY: Lungs are clear to auscultation in the anterior and posterior chest fields.  No wheezes, rales, or rhonchi.  Patient has nonlabored breathing without cough, wheeze, stridor.  PSYCHIATRIC: Patient is alert and oriented.  Mood and affect appear normal.  SKIN: Warm and dry.  No scalp, face, or neck lesions noted.    Assessment and Plan     ICD-10-CM    1. Moderate obstructive sleep " apnea  G47.33 Case Request: DRUG INDUCED SLEEP ENDOSCOPY   2. Intolerance of continuous positive airway pressure (CPAP) ventilation  Z78.9 Case Request: DRUG INDUCED SLEEP ENDOSCOPY   3. BMI 25.0-25.9,adult  Z68.25 Case Request: DRUG INDUCED SLEEP ENDOSCOPY     It was my pleasure seeing Don Deluca today in clinic.  The patient presents to clinic today with moderate obstructive sleep apnea intolerant to CPAP.  The patient's BMI is 25.83 kg/m .  They are interested in the hypoglossal nerve stimulator.  We discussed placement of the hypoglossal nerve stimulator including postoperative course, activation, need for battery change, limitation of the chest/abdomen/pelvis MRI, need for alteration of airport screening.  We discussed the need of drug induced sleep endoscopy to ensure candidacy.  The patient heart and lung exam today is normal.  The patient is appropriate anesthetic and surgical risk for the above-stated procedure.     We discussed the risks, benefits, alternatives, options of drug-induced sleep endoscopy including, but not, limited to: risk of general anesthesia, potential need for additional procedures.  We discussed the postoperative course and convalescence and need for  the day of the procedure.  The patient voiced understanding and is willing to proceed.    Don to follow up with Primary Care provider regarding elevated blood pressure.    Oscar Ma MD  Department of Otolaryngology-Head and Neck Surgery  [facility]

## 2022-05-04 NOTE — LETTER
5/4/2022         RE: Don Deluca  [address] [place] [address]        Dear Colleague,    Thank you for referring your patient, Don Deluca, to the [facility]. Please see a copy of my visit note below.    Chief Complaint   Patient presents with     Consult     Inspire     History of Present Illness   Don Deluca is a 57 year old male who presents today for evaluation.  I am seeing this patient in consultation for obstructive sleep apnea at the request of the provider Dr. Roberto. The patient describes symptoms of poor, unrestful sleep, with snoring for the past several years.  He does report some intermittent nasal obstruction/congestion.  He has not had previous nose or sinus surgery or tonsillectomy.     The patient underwent an outside sleep study on 3/9/2022 which showed an overall AHI of 25.6 events per hour.  Supine AHI was 42.2 events per hour, nonsupine AHI was 11.4 events per hour.  The patient's BMI the time of the study was 25.92 kg/m     Patient has tried CPAP with multiple facemasks including 2 trials of a full facemask, nasal mask.  He has not tried MAD treatment.  He has trouble with wearing the CPAP at night due to the CPAP leaking, getting coiled up in his tubing, changing position multiple times at night.  The CPAP tends to keep him up if he wakes up at night.  He wakes up feeling not well rested and has trouble with daytime somnolence even with CPAP use.  Patient's current BMI is 25.83 kg/m .    Past Medical History  There is no problem list on file for this patient.    Current Medications     Current Outpatient Medications:      amLODIPine (NORVASC) 10 MG tablet, Take 10 mg by mouth daily, Disp: , Rfl:      busPIRone (BUSPAR) 10 MG tablet, Take 10 mg by mouth 3 times daily, Disp: , Rfl:      HYDROCHLOROTHIAZIDE PO, Take by mouth daily, Disp: , Rfl:      SERTRALINE HCL PO, Take 150 mg by mouth every evening, Disp: , Rfl:     Allergies  No Known  "Allergies    Social History   Social History     Socioeconomic History     Marital status: Single       Family History  No family history on file.    Review of Systems  As per HPI and PMHx, otherwise 10+ comprehensive system review is negative.    Physical Exam  BP (!) 149/92 (BP Location: Right arm, Patient Position: Sitting, Cuff Size: Adult Large)   Pulse 54   Temp 97.6  F (36.4  C) (Tympanic)   Ht 1.778 m (5' 10\")   Wt 81.6 kg (180 lb)   BMI 25.83 kg/m    GENERAL: Patient is a pleasant, cooperative 57 year old male in no acute distress.  HEAD: Normocephalic, atraumatic.  Hair and scalp are normal.  EYES: Pupils are equal, round, reactive to light and accommodation.  Extraocular movements are intact.  The sclera nonicteric without injection.  The extraocular structures are normal.  EARS: Normal shape and symmetry.  No tenderness when palpating the mastoid or tragal areas bilaterally.    NOSE: Nares are patent.  Nasal mucosa is pink and moist.  Negative anterior rhinoscopy.  ORAL CAVITY: Dentition is in good repair.  Mucous membranes are moist.  Tongue is mobile, protrudes to the midline.  Palate elevates symmetrically.  Tonsils are 1+, symmetric.  No erythema or exudate.  No oral cavity or oropharyngeal masses, lesions, ulcerations, leukoplakia.  The patient has a Fraire tongue/palate position grade 4.  NECK: Supple, trachea is midline.  The patient has 3 fingerbreadths of hyomental distance.  There no palpable cervical lymphadenopathy or masses bilaterally.    NEUROLOGIC: Cranial nerves II through XII are grossly intact.  Voice is strong.  Patient is House-Brackmann I/VI bilaterally.  CARDIOVASCULAR: Regular rate and rhythm.  Normal S1 and S2.  No murmurs, gallops, or rubs.  Extremities are warm and well-perfused.  No significant peripheral edema.  RESPIRATORY: Lungs are clear to auscultation in the anterior and posterior chest fields.  No wheezes, rales, or rhonchi.  Patient has nonlabored breathing " without cough, wheeze, stridor.  PSYCHIATRIC: Patient is alert and oriented.  Mood and affect appear normal.  SKIN: Warm and dry.  No scalp, face, or neck lesions noted.    Assessment and Plan     ICD-10-CM    1. Moderate obstructive sleep apnea  G47.33 Case Request: DRUG INDUCED SLEEP ENDOSCOPY   2. Intolerance of continuous positive airway pressure (CPAP) ventilation  Z78.9 Case Request: DRUG INDUCED SLEEP ENDOSCOPY   3. BMI 25.0-25.9,adult  Z68.25 Case Request: DRUG INDUCED SLEEP ENDOSCOPY     It was my pleasure seeing Don Deluca today in clinic.  The patient presents to clinic today with moderate obstructive sleep apnea intolerant to CPAP.  The patient's BMI is 25.83 kg/m .  They are interested in the hypoglossal nerve stimulator.  We discussed placement of the hypoglossal nerve stimulator including postoperative course, activation, need for battery change, limitation of the chest/abdomen/pelvis MRI, need for alteration of airport screening.  We discussed the need of drug induced sleep endoscopy to ensure candidacy.  The patient heart and lung exam today is normal.  The patient is appropriate anesthetic and surgical risk for the above-stated procedure.     We discussed the risks, benefits, alternatives, options of drug-induced sleep endoscopy including, but not, limited to: risk of general anesthesia, potential need for additional procedures.  We discussed the postoperative course and convalescence and need for  the day of the procedure.  The patient voiced understanding and is willing to proceed.    Don to follow up with Primary Care provider regarding elevated blood pressure.    Oscar Ma MD  Department of Otolaryngology-Head and Neck Surgery  [facility]        Again, thank you for allowing me to participate in the care of your patient.        Sincerely,        Oscar Ma MD

## 2022-05-04 NOTE — NURSING NOTE
"Initial BP (!) 149/92 (BP Location: Right arm, Patient Position: Sitting, Cuff Size: Adult Large)   Pulse 54   Temp 97.6  F (36.4  C) (Tympanic)   Ht 1.778 m (5' 10\")  There is no height or weight on file to calculate BMI. .    Muriel Vogel LPN on 5/4/2022 at 1:17 PM    "

## 2022-05-08 DIAGNOSIS — Z11.59 ENCOUNTER FOR SCREENING FOR OTHER VIRAL DISEASES: Primary | ICD-10-CM

## 2022-05-08 NOTE — PROGRESS NOTES
Patient has an appointment for a pre surgical covid swab and no orders. Please place future orders as needed.

## 2022-05-17 ENCOUNTER — LAB (OUTPATIENT)
Dept: LAB | Facility: CLINIC | Age: 57
End: 2022-05-17
Payer: COMMERCIAL

## 2022-05-17 DIAGNOSIS — Z11.59 ENCOUNTER FOR SCREENING FOR OTHER VIRAL DISEASES: ICD-10-CM

## 2022-05-17 PROCEDURE — U0005 INFEC AGEN DETEC AMPLI PROBE: HCPCS

## 2022-05-17 PROCEDURE — U0003 INFECTIOUS AGENT DETECTION BY NUCLEIC ACID (DNA OR RNA); SEVERE ACUTE RESPIRATORY SYNDROME CORONAVIRUS 2 (SARS-COV-2) (CORONAVIRUS DISEASE [COVID-19]), AMPLIFIED PROBE TECHNIQUE, MAKING USE OF HIGH THROUGHPUT TECHNOLOGIES AS DESCRIBED BY CMS-2020-01-R: HCPCS

## 2022-05-17 RX ORDER — LISINOPRIL 20 MG/1
10 TABLET ORAL DAILY
COMMUNITY

## 2022-05-17 RX ORDER — CHLORTHALIDONE 25 MG/1
25 TABLET ORAL DAILY
COMMUNITY
End: 2022-06-27

## 2022-05-18 ENCOUNTER — ANESTHESIA EVENT (OUTPATIENT)
Dept: SURGERY | Facility: CLINIC | Age: 57
End: 2022-05-18
Payer: COMMERCIAL

## 2022-05-18 LAB — SARS-COV-2 RNA RESP QL NAA+PROBE: NEGATIVE

## 2022-05-18 RX ORDER — ACETAMINOPHEN 325 MG/1
975 TABLET ORAL ONCE
Status: CANCELLED | OUTPATIENT
Start: 2022-05-18 | End: 2022-05-18

## 2022-05-18 RX ORDER — MAGNESIUM SULFATE HEPTAHYDRATE 40 MG/ML
2 INJECTION, SOLUTION INTRAVENOUS ONCE
Status: CANCELLED | OUTPATIENT
Start: 2022-05-18 | End: 2022-05-18

## 2022-05-18 NOTE — ANESTHESIA PREPROCEDURE EVALUATION
Anesthesia Pre-Procedure Evaluation    Patient: Don Deluca   MRN: [id number] : 1965        Procedure : Procedure(s):  DRUG INDUCED SLEEP ENDOSCOPY          No past medical history on file.   History reviewed. No pertinent surgical history.   No Known Allergies   Social History     Tobacco Use     Smoking status: Never Smoker     Smokeless tobacco: Former User   Substance Use Topics     Alcohol use: Not on file      Wt Readings from Last 1 Encounters:   22 81.6 kg (180 lb)        Anesthesia Evaluation   Pt has had prior anesthetic. Type: General and MAC.    No history of anesthetic complications       ROS/MED HX  ENT/Pulmonary:     (+) sleep apnea, doesn't use CPAP,     Neurologic:       Cardiovascular:     (+) hypertension-----    METS/Exercise Tolerance: >4 METS    Hematologic:       Musculoskeletal:       GI/Hepatic:       Renal/Genitourinary:       Endo:       Psychiatric/Substance Use:     (+) psychiatric history depression     Infectious Disease:       Malignancy:       Other:            Physical Exam    Airway  airway exam normal      Mallampati: I   TM distance: > 3 FB   Neck ROM: full   Mouth opening: > 3 cm    Respiratory Devices and Support         Dental  no notable dental history         Cardiovascular   cardiovascular exam normal          Pulmonary   pulmonary exam normal                OUTSIDE LABS:  CBC:   Lab Results   Component Value Date    WBC 5.2 2017    HGB 14.3 2017    HCT 41.9 2017     2017     BMP:   Lab Results   Component Value Date     2017    POTASSIUM 3.7 2017    CHLORIDE 104 2017    CO2 29 2017    BUN 18 2017    CR 1.42 (H) 2017     (H) 2017     COAGS:   Lab Results   Component Value Date    PTT 26 2017    INR 0.88 2017     POC:   Lab Results   Component Value Date     (H) 2017     HEPATIC:   Lab Results   Component Value Date    ALBUMIN 3.8 2017     PROTTOTAL 7.5 07/18/2017    ALT 25 07/18/2017    AST 17 07/18/2017    ALKPHOS 97 07/18/2017    BILITOTAL 0.3 07/18/2017     OTHER:   Lab Results   Component Value Date    PHU 8.7 07/18/2017    TSH 1.03 07/18/2017       Anesthesia Plan    ASA Status:  2   NPO Status:  NPO Appropriate    Anesthesia Type: MAC.   Induction: Propofol.           Consents    Anesthesia Plan(s) and associated risks, benefits, and realistic alternatives discussed. Questions answered and patient/representative(s) expressed understanding.     - Discussed: Risks, Benefits and Alternatives for BOTH SEDATION and the PROCEDURE were discussed     - Discussed with:  Patient         Postoperative Care            Comments:                JUS Putnam CRNA

## 2022-05-19 ENCOUNTER — HOSPITAL ENCOUNTER (OUTPATIENT)
Facility: CLINIC | Age: 57
Discharge: HOME | End: 2022-05-19
Attending: OTOLARYNGOLOGY | Admitting: OTOLARYNGOLOGY
Payer: COMMERCIAL

## 2022-05-19 ENCOUNTER — ANESTHESIA (OUTPATIENT)
Dept: SURGERY | Facility: CLINIC | Age: 57
End: 2022-05-19
Payer: COMMERCIAL

## 2022-05-19 VITALS
DIASTOLIC BLOOD PRESSURE: 68 MMHG | HEART RATE: 47 BPM | WEIGHT: 180 LBS | TEMPERATURE: 98.5 F | HEIGHT: 70 IN | RESPIRATION RATE: 15 BRPM | OXYGEN SATURATION: 98 % | BODY MASS INDEX: 25.77 KG/M2 | SYSTOLIC BLOOD PRESSURE: 122 MMHG

## 2022-05-19 PROCEDURE — 370N000017 HC ANESTHESIA TECHNICAL FEE, PER MIN: Performed by: OTOLARYNGOLOGY

## 2022-05-19 PROCEDURE — 250N000009 HC RX 250: Performed by: NURSE ANESTHETIST, CERTIFIED REGISTERED

## 2022-05-19 PROCEDURE — 272N000001 HC OR GENERAL SUPPLY STERILE: Performed by: OTOLARYNGOLOGY

## 2022-05-19 PROCEDURE — 42975 DISE EVAL SLP DO BRTH FLX DX: CPT | Performed by: OTOLARYNGOLOGY

## 2022-05-19 PROCEDURE — 999N000141 HC STATISTIC PRE-PROCEDURE NURSING ASSESSMENT: Performed by: OTOLARYNGOLOGY

## 2022-05-19 PROCEDURE — 258N000003 HC RX IP 258 OP 636: Performed by: NURSE ANESTHETIST, CERTIFIED REGISTERED

## 2022-05-19 PROCEDURE — 250N000011 HC RX IP 250 OP 636: Performed by: NURSE ANESTHETIST, CERTIFIED REGISTERED

## 2022-05-19 PROCEDURE — 360N000074 HC SURGERY LEVEL 1, PER MIN: Performed by: OTOLARYNGOLOGY

## 2022-05-19 PROCEDURE — 710N000012 HC RECOVERY PHASE 2, PER MINUTE: Performed by: OTOLARYNGOLOGY

## 2022-05-19 RX ORDER — PROPOFOL 10 MG/ML
INJECTION, EMULSION INTRAVENOUS PRN
Status: DISCONTINUED | OUTPATIENT
Start: 2022-05-19 | End: 2022-05-19

## 2022-05-19 RX ORDER — ONDANSETRON 4 MG/1
4 TABLET, ORALLY DISINTEGRATING ORAL
Status: DISCONTINUED | OUTPATIENT
Start: 2022-05-19 | End: 2022-05-19 | Stop reason: HOSPADM

## 2022-05-19 RX ORDER — LIDOCAINE 40 MG/G
CREAM TOPICAL
Status: DISCONTINUED | OUTPATIENT
Start: 2022-05-19 | End: 2022-05-19 | Stop reason: HOSPADM

## 2022-05-19 RX ORDER — SODIUM CHLORIDE, SODIUM LACTATE, POTASSIUM CHLORIDE, CALCIUM CHLORIDE 600; 310; 30; 20 MG/100ML; MG/100ML; MG/100ML; MG/100ML
INJECTION, SOLUTION INTRAVENOUS CONTINUOUS
Status: DISCONTINUED | OUTPATIENT
Start: 2022-05-19 | End: 2022-05-19 | Stop reason: HOSPADM

## 2022-05-19 RX ADMIN — PROPOFOL 100 MCG/KG/MIN: 10 INJECTION, EMULSION INTRAVENOUS at 11:54

## 2022-05-19 RX ADMIN — SODIUM CHLORIDE, POTASSIUM CHLORIDE, SODIUM LACTATE AND CALCIUM CHLORIDE: 600; 310; 30; 20 INJECTION, SOLUTION INTRAVENOUS at 11:15

## 2022-05-19 RX ADMIN — PROPOFOL 10 MG: 10 INJECTION, EMULSION INTRAVENOUS at 11:53

## 2022-05-19 RX ADMIN — LIDOCAINE HYDROCHLORIDE 0.2 ML: 10 INJECTION, SOLUTION EPIDURAL; INFILTRATION; INTRACAUDAL; PERINEURAL at 11:15

## 2022-05-19 NOTE — ANESTHESIA CARE TRANSFER NOTE
Patient: Don Deluca    Procedure: Procedure(s):  DRUG INDUCED SLEEP ENDOSCOPY       Diagnosis: Moderate obstructive sleep apnea [G47.33]  Intolerance of continuous positive airway pressure (CPAP) ventilation [Z78.9]  BMI 25.0-25.9,adult [Z68.25]  Diagnosis Additional Information: No value filed.    Anesthesia Type:   MAC     Note:    Oropharynx: oropharynx clear of all foreign objects and spontaneously breathing  Level of Consciousness: awake  Oxygen Supplementation: room air    Independent Airway: airway patency satisfactory and stable  Dentition: dentition unchanged  Vital Signs Stable: post-procedure vital signs reviewed and stable  Report to RN Given: handoff report given  Patient transferred to: Phase II    Handoff Report: Identifed the Patient, Identified the Reponsible Provider, Reviewed the pertinent medical history, Discussed the surgical course, Reviewed Intra-OP anesthesia mangement and issues during anesthesia, Set expectations for post-procedure period and Allowed opportunity for questions and acknowledgement of understanding      Vitals:  Vitals Value Taken Time   BP     Temp     Pulse     Resp     SpO2         Electronically Signed By: JUS Perea CRNA  May 19, 2022  12:08 PM

## 2022-05-19 NOTE — ANESTHESIA POSTPROCEDURE EVALUATION
Patient: Don Deluca    Procedure: Procedure(s):  DRUG INDUCED SLEEP ENDOSCOPY       Anesthesia Type:  MAC    Note:  Disposition: Outpatient   Postop Pain Control: Uneventful            Sign Out: Well controlled pain   PONV: No   Neuro/Psych: Uneventful            Sign Out: Acceptable/Baseline neuro status   Airway/Respiratory: Uneventful            Sign Out: Acceptable/Baseline resp. status   CV/Hemodynamics: Uneventful            Sign Out: Acceptable CV status; No obvious hypovolemia; No obvious fluid overload   Other NRE: NONE   DID A NON-ROUTINE EVENT OCCUR? No           Last vitals:  Vitals Value Taken Time   /78 05/19/22 1208   Temp 36.9  C (98.5  F) 05/19/22 1208   Pulse 54 05/19/22 1208   Resp 15 05/19/22 1208   SpO2 95 % 05/19/22 1208       Electronically Signed By: JUS Perea CRNA  May 19, 2022  12:11 PM

## 2022-05-19 NOTE — DISCHARGE INSTRUCTIONS
Discharge Instructions    Recovery - Everyone recovers differently from a general anesthetic.  Symptoms such as fatigue, nausea, lightheadedness, and sometimes a low grade fever (up to 100 degrees) are not unusual.  As your body removes the anesthetic drugs from circulation, these symptoms will resolve.  You can resume a normal diet once awaking from anesthesia.  Once anesthesia wears off, you can return to normal activity.    Medications - Usually pain medication is not required after this procedure.  Over-the-counter pain medicine can be used if needed.    Follow up - We will contact you for follow-up after we submit to insurance.    If there are any questions or issues with the above, or if there are other issues that concern you, always feel free to call the clinic and I am happy to speak with you as soon as feasible.    Oscar Ma MD  Department of Otolaryngology-Head and Neck Surgery  [facility]  [phone] or [phone] After hours, [facility] option                        Same Day Surgery Discharge Instructions  Special Precautions After Surgery - Adult    It is not unusual to feel lightheaded or faint, up to 24 hours after surgery or while taking pain medication.  If you have these symptoms; sit for a few minutes before standing and have someone assist you when getting up.  You should rest and relax for the next 24 hours and must have someone stay with you for at least 24 hours after your discharge.  DO NOT DRIVE any vehicle or operate mechanical equipment for 24 hours following the end of your surgery.  DO NOT DRIVE while taking narcotic pain medications that have been prescribed by your physician.  If you had a limb operated on, you must be able to use it fully to drive.  DO NOT drink alcoholic beverages for 24 hours following surgery or while taking prescription pain medication.  Drink clear liquids (apple juice, ginger ale, broth, 7-Up, etc.).  Progress to your regular  diet as you feel able.  Any questions call your physician and do not make important decisions for 24 hours.    __________________________________________________________________________________________________________________________________  IMPORTANT NUMBERS:    [facility] Main Number:  [phone], [phone]  Pharmacy:  [phone]  Same Day Surgery:  [phone], Monday - Friday until 8:30 p.m.  Urgent Care:  [phone]  Emergency Room:  [phone]      Surgery Specialty Clinic:  [phone]

## 2022-05-19 NOTE — OP NOTE
PREOPERATIVE DIAGNOSES: Moderate obstructive sleep apnea, intolerance to CPAP.     POSTOPERATIVE DIAGNOSES: Same.      PROCEDURE PERFORMED:   1. Drug-induced sleep endoscopy     SURGEON: Oscar Ma MD      ASSISTANTS: None.     BLOOD LOSS:  None.       COMPLICATIONS: None.      SPECIMENS: None.     ANESTHESIA: General.     GRAFTS, IMPLANTS, DRAINS: None.     INDICATIONS: The patient has a history of moderate obstructive sleep apnea and is intolerant to CPAP.  The patient presents today for drug-induced sleep endoscopy to assess candidacy for implantation of upper airway stimulation.     FINDINGS:   1. Primarily anteroposterior collapse of the velopharyngeal airway.   2. Patient is a candidate for hypoglossal nerve upper airway stimulation.     OPERATIVE TECHNIQUE: The patient was brought to the operating room and identified by name clinic number.  They were placed supinely on the operating room table.  The patient was given a loading dose of propofol and then started on IV propofol infusion titrating up to 175 mcg/h to induce sleep state.  The patient was was kept spontaneously breathing.  We waited until the patient was nonresponsive and in a sleep state.  End-tidal CO2 was used to monitor breathing.      Once sleep state was reached, after standard surgical pause, the bilateral nares were anesthetized with 2 mL of 4% lidocaine and phenylephrine.  The nasopharynx was visualized.  The patient had primarily anteroposterior collapse in the velopharynx, primarily anteroposterior collapse of the oropharynx, primarily anteroposterior collapse of the tongue base, primarily anterioposterior collapse of the epiglottis.                       Based on the patient's examination, the patient would be a candidate for the hypoglossal nerve stimulator.     This marked the end of the procedure.  The patient was then turned over to anesthesia for recovery where they were awakened and transferred to the PACU in excellent  condition.  There were no complications.  There was minimal blood loss.  All standard operating room protocol and universal precautions were used throughout the procedure.     Oscar Ma MD  Department of Otolaryngology-Head and Neck Surgery  [facility]

## 2022-05-24 ENCOUNTER — TELEPHONE (OUTPATIENT)
Dept: OTOLARYNGOLOGY | Facility: CLINIC | Age: 57
End: 2022-05-24
Payer: COMMERCIAL

## 2022-05-24 DIAGNOSIS — Z78.9 INTOLERANCE OF CONTINUOUS POSITIVE AIRWAY PRESSURE (CPAP) VENTILATION: ICD-10-CM

## 2022-05-24 DIAGNOSIS — G47.33 MODERATE OBSTRUCTIVE SLEEP APNEA: Primary | ICD-10-CM

## 2022-05-24 DIAGNOSIS — Z01.818 PRE-OP TESTING: Primary | ICD-10-CM

## 2022-05-24 PROBLEM — F41.1 GENERALIZED ANXIETY DISORDER: Status: ACTIVE | Noted: 2019-10-03

## 2022-05-24 PROBLEM — E04.1 COLLOID THYROID NODULE: Status: ACTIVE | Noted: 2017-08-02

## 2022-05-24 PROBLEM — F45.8 BRUXISM: Status: ACTIVE | Noted: 2022-03-01

## 2022-05-24 PROBLEM — N18.30 CKD (CHRONIC KIDNEY DISEASE) STAGE 3, GFR 30-59 ML/MIN (H): Status: ACTIVE | Noted: 2018-03-21

## 2022-05-24 PROBLEM — R00.1 BRADYCARDIA: Status: ACTIVE | Noted: 2022-03-01

## 2022-05-24 NOTE — TELEPHONE ENCOUNTER
Reason for Call:  Other call back    Detailed comments: Patient calling to schedule surgery for Inspire. Pt states he received notification from insurance that this has been approved.     Phone Number Patient can be reached at: Cell number on file:    Telephone Information:   Mobile [phone]       Best Time: any    Can we leave a detailed message on this number? YES    Call taken on 5/24/2022 at 8:47 AM by Chikis Baugh

## 2022-05-25 NOTE — TELEPHONE ENCOUNTER
Patient scheduled for Inspire 7-1-22 and Covid 6-28 @ 10 am at [facility].  Micaela Carias CMA

## 2022-06-27 RX ORDER — ATORVASTATIN CALCIUM 40 MG/1
40 TABLET, FILM COATED ORAL DAILY
COMMUNITY

## 2022-06-29 ENCOUNTER — LAB (OUTPATIENT)
Dept: LAB | Facility: CLINIC | Age: 57
End: 2022-06-29
Payer: COMMERCIAL

## 2022-06-29 DIAGNOSIS — Z01.818 PRE-OP TESTING: ICD-10-CM

## 2022-06-29 LAB — SARS-COV-2 RNA RESP QL NAA+PROBE: NEGATIVE

## 2022-06-29 PROCEDURE — U0005 INFEC AGEN DETEC AMPLI PROBE: HCPCS

## 2022-06-29 PROCEDURE — U0003 INFECTIOUS AGENT DETECTION BY NUCLEIC ACID (DNA OR RNA); SEVERE ACUTE RESPIRATORY SYNDROME CORONAVIRUS 2 (SARS-COV-2) (CORONAVIRUS DISEASE [COVID-19]), AMPLIFIED PROBE TECHNIQUE, MAKING USE OF HIGH THROUGHPUT TECHNOLOGIES AS DESCRIBED BY CMS-2020-01-R: HCPCS

## 2022-06-30 ENCOUNTER — ANESTHESIA EVENT (OUTPATIENT)
Dept: SURGERY | Facility: CLINIC | Age: 57
End: 2022-06-30
Payer: COMMERCIAL

## 2022-06-30 NOTE — PROGRESS NOTES
Chief Complaint   Patient presents with     Post-op Visit     Post op Inspire- 1 week      History of Present Illness  Don Deluca is a 57 year old male who presents today for follow-up.  The patient went to the operating room on 7/1/2022 and underwent placement of the hypoglossal nerve stimulator.  The patient has done well postoperatively with minimal pain. The patient denies any problems with the incision.  The patient presents today for follow-up.     Past Medical History  Patient Active Problem List   Diagnosis     CKD (chronic kidney disease) stage 3, GFR 30-59 ml/min (H)     Obstructive sleep apnea     Colloid thyroid nodule     Bradycardia     Bruxism     Hypertension     Generalized anxiety disorder     Current Medications    Current Outpatient Medications:      acetaminophen (TYLENOL) 500 MG tablet, Take 2 tablets (1,000 mg) by mouth every 6 hours as needed for pain or fever Every 6 hours as needed for post-op pain.  Alternate with ibuprofen. (Patient taking differently: Take 1,000 mg by mouth every 6 hours as needed for pain or fever Every 6 hours as needed for post-op pain.  Alternate with ibuprofen.), Disp: 200 tablet, Rfl: 1     atorvastatin (LIPITOR) 40 MG tablet, Take 40 mg by mouth daily, Disp: , Rfl:      busPIRone (BUSPAR) 10 MG tablet, Take 15 mg by mouth daily, Disp: , Rfl:      ibuprofen (ADVIL/MOTRIN) 200 MG tablet, Take 2 tablets (400 mg) by mouth every 6 hours as needed for moderate pain, Disp: 200 tablet, Rfl: 1     lisinopril (ZESTRIL) 20 MG tablet, Take 10 mg by mouth daily, Disp: , Rfl:      oxyCODONE (ROXICODONE) 5 MG tablet, Take 1 tablet (5 mg) by mouth every 4 hours as needed for severe pain or breakthrough pain, Disp: 15 tablet, Rfl: 0     SENNA-docusate sodium (SENNA S) 8.6-50 MG tablet, Take 1 tablet by mouth At Bedtime Use while taking narcotic pain medications.  Hold for diarrhea., Disp: 30 tablet, Rfl: 1     SERTRALINE HCL PO, Take 150 mg by mouth every evening, Disp: ,  "Rfl:     Allergies  No Known Allergies    Social History  Social History     Socioeconomic History     Marital status: Single   Tobacco Use     Smoking status: Never Smoker     Smokeless tobacco: Former User       Family History  History reviewed. No pertinent family history.    Review of Systems  As per HPI and PMHx, otherwise 10 system review including the head and neck, constitutional, eyes, respiratory, GI, skin, neurologic, lymphatic, endocrine, and allergy systems is negative.    Physical Exam  /64 (BP Location: Right arm, Patient Position: Sitting, Cuff Size: Adult Regular)   Pulse (!) 44   Temp 97.8  F (36.6  C) (Tympanic)   Ht 1.778 m (5' 10\")   Wt 81.6 kg (180 lb)   BMI 25.83 kg/m    GENERAL: Patient is a pleasant, cooperative 57 year old male in no acute distress.  HEAD: Normocephalic, atraumatic.  Hair and scalp are normal.  EYES: Pupils are equal, round, reactive to light and accommodation.  Extraocular movements are intact.  The sclera nonicteric without injection.  The extraocular structures are normal.  EARS: Normal shape and symmetry.  No tenderness when palpating the mastoid or tragal areas bilaterally.  Otoscopic exam reveals a minimal amount of cerumen bilaterally.  The bilateral tympanic membranes are round, intact without evidence of effusion, good landmarks.  No retraction, granulation, or drainage.  NOSE: Nares are patent.  Nasal mucosa is pink and moist.  Negative anterior rhinoscopy.  ORAL CAVITY: Dentition is in good repair.  Mucous membranes are moist.  Tongue is mobile, protrudes to the midline.  Palate elevates symmetrically.  NECK: Supple, trachea is midline.  The right submental incision is clean, dry, intact.  No erythema or fluctuance.  No evidence of hematoma or seroma.    CHEST: The right upper chest incision is clean, dry, intact.  No evidence of hematoma or seroma.  No erythema or fluctuance.  NEUROLOGIC: Cranial nerves II through XII are grossly intact.  Voice is " strong.  Patient is House-Brackmann I/VI on the right and House-Brackmann I/VI on the left.  CARDIOVASCULAR: Extremities are warm and well-perfused.  No significant peripheral edema.  RESPIRATORY: Patient has nonlabored breathing without cough, wheeze, stridor.  PSYCHIATRIC: Patient is alert and oriented.  Mood and affect appear normal.  SKIN: Warm and dry.  No scalp, face, or neck lesions noted.    Assessment and Plan     ICD-10-CM    1. Moderate obstructive sleep apnea  G47.33    2. Intolerance of continuous positive airway pressure (CPAP) ventilation  Z78.9    3. BMI 25.0-25.9,adult  Z68.25    4. Status post insertion of nerve stimulator  Z96.82    5. Postoperative state  Z98.890       It was my pleasure seeing Don Deluca today in clinic.  The patient is healing well after placement of the hypoglossal nerve stimulator.  We discussed lifting and activity restrictions.  We discussed incision care and follow-up. I would like to see the patient back in 3 weeks for a recheck.  The patient will need to meet with sleep medicine ( Friday) for activation 1 month after placement of the device. The paient knows to contact me sooner with any problems or concerns.    Oscar Ma MD  Department of Otolaryngology-Head and Neck Surgery  [facility]

## 2022-07-01 ENCOUNTER — HOSPITAL ENCOUNTER (OUTPATIENT)
Facility: CLINIC | Age: 57
Discharge: HOME | End: 2022-07-01
Attending: OTOLARYNGOLOGY | Admitting: OTOLARYNGOLOGY
Payer: COMMERCIAL

## 2022-07-01 ENCOUNTER — APPOINTMENT (OUTPATIENT)
Dept: GENERAL RADIOLOGY | Facility: CLINIC | Age: 57
End: 2022-07-01
Attending: OTOLARYNGOLOGY
Payer: COMMERCIAL

## 2022-07-01 ENCOUNTER — ANESTHESIA (OUTPATIENT)
Dept: SURGERY | Facility: CLINIC | Age: 57
End: 2022-07-01
Payer: COMMERCIAL

## 2022-07-01 VITALS
DIASTOLIC BLOOD PRESSURE: 55 MMHG | HEART RATE: 62 BPM | RESPIRATION RATE: 16 BRPM | TEMPERATURE: 98.3 F | WEIGHT: 180 LBS | BODY MASS INDEX: 25.77 KG/M2 | SYSTOLIC BLOOD PRESSURE: 96 MMHG | HEIGHT: 70 IN | OXYGEN SATURATION: 95 %

## 2022-07-01 DIAGNOSIS — Z96.82 STATUS POST INSERTION OF NERVE STIMULATOR: Primary | ICD-10-CM

## 2022-07-01 PROCEDURE — 250N000009 HC RX 250: Performed by: NURSE ANESTHETIST, CERTIFIED REGISTERED

## 2022-07-01 PROCEDURE — 710N000009 HC RECOVERY PHASE 1, LEVEL 1, PER MIN: Performed by: OTOLARYNGOLOGY

## 2022-07-01 PROCEDURE — C1767 GENERATOR, NEURO NON-RECHARG: HCPCS | Performed by: OTOLARYNGOLOGY

## 2022-07-01 PROCEDURE — 250N000011 HC RX IP 250 OP 636: Performed by: NURSE ANESTHETIST, CERTIFIED REGISTERED

## 2022-07-01 PROCEDURE — 370N000017 HC ANESTHESIA TECHNICAL FEE, PER MIN: Performed by: OTOLARYNGOLOGY

## 2022-07-01 PROCEDURE — 258N000003 HC RX IP 258 OP 636: Performed by: NURSE ANESTHETIST, CERTIFIED REGISTERED

## 2022-07-01 PROCEDURE — 250N000011 HC RX IP 250 OP 636: Performed by: OTOLARYNGOLOGY

## 2022-07-01 PROCEDURE — 64582 OPN MPLTJ HPGLSL NSTM ARY PG: CPT | Performed by: OTOLARYNGOLOGY

## 2022-07-01 PROCEDURE — 360N000076 HC SURGERY LEVEL 3, PER MIN: Performed by: OTOLARYNGOLOGY

## 2022-07-01 PROCEDURE — 710N000012 HC RECOVERY PHASE 2, PER MINUTE: Performed by: OTOLARYNGOLOGY

## 2022-07-01 PROCEDURE — 250N000009 HC RX 250: Performed by: OTOLARYNGOLOGY

## 2022-07-01 PROCEDURE — 272N000001 HC OR GENERAL SUPPLY STERILE: Performed by: OTOLARYNGOLOGY

## 2022-07-01 PROCEDURE — 999N000065 XR NECK SOFT TISSUE PORT

## 2022-07-01 PROCEDURE — 999N000141 HC STATISTIC PRE-PROCEDURE NURSING ASSESSMENT: Performed by: OTOLARYNGOLOGY

## 2022-07-01 PROCEDURE — C1778 LEAD, NEUROSTIMULATOR: HCPCS | Performed by: OTOLARYNGOLOGY

## 2022-07-01 PROCEDURE — 250N000025 HC SEVOFLURANE, PER MIN: Performed by: OTOLARYNGOLOGY

## 2022-07-01 PROCEDURE — 250N000013 HC RX MED GY IP 250 OP 250 PS 637: Performed by: NURSE ANESTHETIST, CERTIFIED REGISTERED

## 2022-07-01 PROCEDURE — 999N000065 XR CHEST PORT 1 VIEW

## 2022-07-01 PROCEDURE — 250N000013 HC RX MED GY IP 250 OP 250 PS 637: Performed by: OTOLARYNGOLOGY

## 2022-07-01 DEVICE — GENERATOR PULSE INSPIRE CPAP 3028: Type: IMPLANTABLE DEVICE | Site: CHEST | Status: FUNCTIONAL

## 2022-07-01 DEVICE — LEAD SENSING INSPIRE RESPIRATORY 4340: Type: IMPLANTABLE DEVICE | Site: CHEST | Status: FUNCTIONAL

## 2022-07-01 DEVICE — LEAD STIMULATION INSPIRE 4063-45: Type: IMPLANTABLE DEVICE | Site: NECK | Status: FUNCTIONAL

## 2022-07-01 RX ORDER — NALOXONE HYDROCHLORIDE 0.4 MG/ML
0.2 INJECTION, SOLUTION INTRAMUSCULAR; INTRAVENOUS; SUBCUTANEOUS
Status: DISCONTINUED | OUTPATIENT
Start: 2022-07-01 | End: 2022-07-01

## 2022-07-01 RX ORDER — IBUPROFEN 200 MG
600 TABLET ORAL
Status: DISCONTINUED | OUTPATIENT
Start: 2022-07-01 | End: 2022-07-01 | Stop reason: HOSPADM

## 2022-07-01 RX ORDER — SODIUM CHLORIDE, SODIUM LACTATE, POTASSIUM CHLORIDE, CALCIUM CHLORIDE 600; 310; 30; 20 MG/100ML; MG/100ML; MG/100ML; MG/100ML
INJECTION, SOLUTION INTRAVENOUS CONTINUOUS
Status: DISCONTINUED | OUTPATIENT
Start: 2022-07-01 | End: 2022-07-01 | Stop reason: HOSPADM

## 2022-07-01 RX ORDER — NALOXONE HYDROCHLORIDE 0.4 MG/ML
0.4 INJECTION, SOLUTION INTRAMUSCULAR; INTRAVENOUS; SUBCUTANEOUS
Status: DISCONTINUED | OUTPATIENT
Start: 2022-07-01 | End: 2022-07-01

## 2022-07-01 RX ORDER — HYDROMORPHONE HCL IN WATER/PF 6 MG/30 ML
0.2 PATIENT CONTROLLED ANALGESIA SYRINGE INTRAVENOUS EVERY 5 MIN PRN
Status: DISCONTINUED | OUTPATIENT
Start: 2022-07-01 | End: 2022-07-01 | Stop reason: HOSPADM

## 2022-07-01 RX ORDER — OXYCODONE HYDROCHLORIDE 5 MG/1
5 TABLET ORAL EVERY 4 HOURS PRN
Qty: 15 TABLET | Refills: 0 | Status: SHIPPED | OUTPATIENT
Start: 2022-07-01 | End: 2022-08-03

## 2022-07-01 RX ORDER — GLYCOPYRROLATE 0.2 MG/ML
INJECTION, SOLUTION INTRAMUSCULAR; INTRAVENOUS PRN
Status: DISCONTINUED | OUTPATIENT
Start: 2022-07-01 | End: 2022-07-01

## 2022-07-01 RX ORDER — PROPOFOL 10 MG/ML
INJECTION, EMULSION INTRAVENOUS PRN
Status: DISCONTINUED | OUTPATIENT
Start: 2022-07-01 | End: 2022-07-01

## 2022-07-01 RX ORDER — ONDANSETRON 2 MG/ML
4 INJECTION INTRAMUSCULAR; INTRAVENOUS EVERY 30 MIN PRN
Status: DISCONTINUED | OUTPATIENT
Start: 2022-07-01 | End: 2022-07-01 | Stop reason: HOSPADM

## 2022-07-01 RX ORDER — ACETAMINOPHEN 500 MG
1000 TABLET ORAL EVERY 6 HOURS PRN
Qty: 200 TABLET | Refills: 1 | Status: SHIPPED | OUTPATIENT
Start: 2022-07-01 | End: 2022-07-11

## 2022-07-01 RX ORDER — NALOXONE HYDROCHLORIDE 0.4 MG/ML
0.4 INJECTION, SOLUTION INTRAMUSCULAR; INTRAVENOUS; SUBCUTANEOUS
Status: DISCONTINUED | OUTPATIENT
Start: 2022-07-01 | End: 2022-07-01 | Stop reason: HOSPADM

## 2022-07-01 RX ORDER — LIDOCAINE HYDROCHLORIDE AND EPINEPHRINE 10; 10 MG/ML; UG/ML
INJECTION, SOLUTION INFILTRATION; PERINEURAL PRN
Status: DISCONTINUED | OUTPATIENT
Start: 2022-07-01 | End: 2022-07-01 | Stop reason: HOSPADM

## 2022-07-01 RX ORDER — CEFAZOLIN SODIUM/WATER 2 G/20 ML
2 SYRINGE (ML) INTRAVENOUS SEE ADMIN INSTRUCTIONS
Status: DISCONTINUED | OUTPATIENT
Start: 2022-07-01 | End: 2022-07-01 | Stop reason: HOSPADM

## 2022-07-01 RX ORDER — ACETAMINOPHEN 325 MG/1
975 TABLET ORAL ONCE
Status: COMPLETED | OUTPATIENT
Start: 2022-07-01 | End: 2022-07-01

## 2022-07-01 RX ORDER — HYDROXYZINE HYDROCHLORIDE 50 MG/1
50 TABLET, FILM COATED ORAL EVERY 6 HOURS PRN
Status: DISCONTINUED | OUTPATIENT
Start: 2022-07-01 | End: 2022-07-01 | Stop reason: HOSPADM

## 2022-07-01 RX ORDER — PHENYLEPHRINE HYDROCHLORIDE 10 MG/ML
INJECTION INTRAVENOUS PRN
Status: DISCONTINUED | OUTPATIENT
Start: 2022-07-01 | End: 2022-07-01

## 2022-07-01 RX ORDER — MEPERIDINE HYDROCHLORIDE 25 MG/ML
12.5 INJECTION INTRAMUSCULAR; INTRAVENOUS; SUBCUTANEOUS
Status: DISCONTINUED | OUTPATIENT
Start: 2022-07-01 | End: 2022-07-01 | Stop reason: HOSPADM

## 2022-07-01 RX ORDER — GABAPENTIN 300 MG/1
300 CAPSULE ORAL
Status: COMPLETED | OUTPATIENT
Start: 2022-07-01 | End: 2022-07-01

## 2022-07-01 RX ORDER — CEFAZOLIN SODIUM/WATER 2 G/20 ML
2 SYRINGE (ML) INTRAVENOUS
Status: COMPLETED | OUTPATIENT
Start: 2022-07-01 | End: 2022-07-01

## 2022-07-01 RX ORDER — EPHEDRINE SULFATE 50 MG/ML
INJECTION, SOLUTION INTRAVENOUS PRN
Status: DISCONTINUED | OUTPATIENT
Start: 2022-07-01 | End: 2022-07-01

## 2022-07-01 RX ORDER — SENNA AND DOCUSATE SODIUM 50; 8.6 MG/1; MG/1
1 TABLET, FILM COATED ORAL AT BEDTIME
Qty: 30 TABLET | Refills: 1 | Status: SHIPPED | OUTPATIENT
Start: 2022-07-01 | End: 2022-08-03

## 2022-07-01 RX ORDER — NALOXONE HYDROCHLORIDE 0.4 MG/ML
0.2 INJECTION, SOLUTION INTRAMUSCULAR; INTRAVENOUS; SUBCUTANEOUS
Status: DISCONTINUED | OUTPATIENT
Start: 2022-07-01 | End: 2022-07-01 | Stop reason: HOSPADM

## 2022-07-01 RX ORDER — IBUPROFEN 200 MG
400 TABLET ORAL EVERY 6 HOURS PRN
Qty: 200 TABLET | Refills: 1 | Status: SHIPPED | OUTPATIENT
Start: 2022-07-01 | End: 2022-08-03

## 2022-07-01 RX ORDER — KETAMINE HYDROCHLORIDE 10 MG/ML
INJECTION INTRAMUSCULAR; INTRAVENOUS PRN
Status: DISCONTINUED | OUTPATIENT
Start: 2022-07-01 | End: 2022-07-01

## 2022-07-01 RX ORDER — FENTANYL CITRATE 50 UG/ML
25 INJECTION, SOLUTION INTRAMUSCULAR; INTRAVENOUS EVERY 5 MIN PRN
Status: DISCONTINUED | OUTPATIENT
Start: 2022-07-01 | End: 2022-07-01 | Stop reason: HOSPADM

## 2022-07-01 RX ORDER — OXYCODONE HYDROCHLORIDE 5 MG/1
5 TABLET ORAL EVERY 4 HOURS PRN
Status: DISCONTINUED | OUTPATIENT
Start: 2022-07-01 | End: 2022-07-01 | Stop reason: HOSPADM

## 2022-07-01 RX ORDER — ACETAMINOPHEN 325 MG/1
975 TABLET ORAL ONCE
Status: DISCONTINUED | OUTPATIENT
Start: 2022-07-01 | End: 2022-07-01 | Stop reason: HOSPADM

## 2022-07-01 RX ORDER — OXYCODONE HYDROCHLORIDE 5 MG/1
5 TABLET ORAL
Status: COMPLETED | OUTPATIENT
Start: 2022-07-01 | End: 2022-07-01

## 2022-07-01 RX ORDER — FENTANYL CITRATE 50 UG/ML
INJECTION, SOLUTION INTRAMUSCULAR; INTRAVENOUS PRN
Status: DISCONTINUED | OUTPATIENT
Start: 2022-07-01 | End: 2022-07-01

## 2022-07-01 RX ORDER — LIDOCAINE HYDROCHLORIDE 10 MG/ML
INJECTION, SOLUTION INFILTRATION; PERINEURAL PRN
Status: DISCONTINUED | OUTPATIENT
Start: 2022-07-01 | End: 2022-07-01

## 2022-07-01 RX ORDER — DEXAMETHASONE SODIUM PHOSPHATE 4 MG/ML
INJECTION, SOLUTION INTRA-ARTICULAR; INTRALESIONAL; INTRAMUSCULAR; INTRAVENOUS; SOFT TISSUE PRN
Status: DISCONTINUED | OUTPATIENT
Start: 2022-07-01 | End: 2022-07-01

## 2022-07-01 RX ORDER — ONDANSETRON 4 MG/1
4 TABLET, ORALLY DISINTEGRATING ORAL EVERY 30 MIN PRN
Status: DISCONTINUED | OUTPATIENT
Start: 2022-07-01 | End: 2022-07-01 | Stop reason: HOSPADM

## 2022-07-01 RX ORDER — HYDROXYZINE HYDROCHLORIDE 25 MG/1
25 TABLET, FILM COATED ORAL EVERY 6 HOURS PRN
Status: DISCONTINUED | OUTPATIENT
Start: 2022-07-01 | End: 2022-07-01 | Stop reason: HOSPADM

## 2022-07-01 RX ORDER — ONDANSETRON 4 MG/1
4 TABLET, ORALLY DISINTEGRATING ORAL
Status: DISCONTINUED | OUTPATIENT
Start: 2022-07-01 | End: 2022-07-01 | Stop reason: HOSPADM

## 2022-07-01 RX ORDER — ONDANSETRON 2 MG/ML
INJECTION INTRAMUSCULAR; INTRAVENOUS PRN
Status: DISCONTINUED | OUTPATIENT
Start: 2022-07-01 | End: 2022-07-01

## 2022-07-01 RX ORDER — LIDOCAINE 40 MG/G
CREAM TOPICAL
Status: DISCONTINUED | OUTPATIENT
Start: 2022-07-01 | End: 2022-07-01 | Stop reason: HOSPADM

## 2022-07-01 RX ORDER — FENTANYL CITRATE 50 UG/ML
25 INJECTION, SOLUTION INTRAMUSCULAR; INTRAVENOUS
Status: DISCONTINUED | OUTPATIENT
Start: 2022-07-01 | End: 2022-07-01 | Stop reason: HOSPADM

## 2022-07-01 RX ADMIN — PROPOFOL 200 MG: 10 INJECTION, EMULSION INTRAVENOUS at 11:30

## 2022-07-01 RX ADMIN — PHENYLEPHRINE HYDROCHLORIDE 100 MCG: 10 INJECTION INTRAVENOUS at 13:05

## 2022-07-01 RX ADMIN — EPHEDRINE SULFATE 10 MG: 50 INJECTION, SOLUTION INTRAVENOUS at 12:05

## 2022-07-01 RX ADMIN — PHENYLEPHRINE HYDROCHLORIDE 200 MCG: 10 INJECTION INTRAVENOUS at 12:30

## 2022-07-01 RX ADMIN — OXYCODONE HYDROCHLORIDE 5 MG: 5 TABLET ORAL at 14:21

## 2022-07-01 RX ADMIN — LIDOCAINE HYDROCHLORIDE 40 MG: 10 INJECTION, SOLUTION INFILTRATION; PERINEURAL at 11:30

## 2022-07-01 RX ADMIN — EPHEDRINE SULFATE 5 MG: 50 INJECTION, SOLUTION INTRAVENOUS at 11:58

## 2022-07-01 RX ADMIN — HYDROXYZINE HYDROCHLORIDE 50 MG: 50 TABLET, FILM COATED ORAL at 15:06

## 2022-07-01 RX ADMIN — EPHEDRINE SULFATE 10 MG: 50 INJECTION, SOLUTION INTRAVENOUS at 12:00

## 2022-07-01 RX ADMIN — ROCURONIUM BROMIDE 50 MG: 50 INJECTION, SOLUTION INTRAVENOUS at 11:30

## 2022-07-01 RX ADMIN — PHENYLEPHRINE HYDROCHLORIDE 100 MCG: 10 INJECTION INTRAVENOUS at 12:27

## 2022-07-01 RX ADMIN — OXYCODONE HYDROCHLORIDE 5 MG: 5 TABLET ORAL at 14:51

## 2022-07-01 RX ADMIN — PHENYLEPHRINE HYDROCHLORIDE 100 MCG: 10 INJECTION INTRAVENOUS at 12:58

## 2022-07-01 RX ADMIN — KETAMINE HYDROCHLORIDE 50 MG: 10 INJECTION, SOLUTION INTRAMUSCULAR; INTRAVENOUS at 12:22

## 2022-07-01 RX ADMIN — PHENYLEPHRINE HYDROCHLORIDE 100 MCG: 10 INJECTION INTRAVENOUS at 12:24

## 2022-07-01 RX ADMIN — FENTANYL CITRATE 100 MCG: 50 INJECTION, SOLUTION INTRAMUSCULAR; INTRAVENOUS at 11:30

## 2022-07-01 RX ADMIN — PHENYLEPHRINE HYDROCHLORIDE 100 MCG: 10 INJECTION INTRAVENOUS at 12:20

## 2022-07-01 RX ADMIN — SUGAMMADEX 200 MG: 100 INJECTION, SOLUTION INTRAVENOUS at 11:43

## 2022-07-01 RX ADMIN — MIDAZOLAM 2 MG: 1 INJECTION INTRAMUSCULAR; INTRAVENOUS at 11:28

## 2022-07-01 RX ADMIN — PHENYLEPHRINE HYDROCHLORIDE 100 MCG: 10 INJECTION INTRAVENOUS at 12:46

## 2022-07-01 RX ADMIN — ACETAMINOPHEN 975 MG: 325 TABLET, FILM COATED ORAL at 10:50

## 2022-07-01 RX ADMIN — PHENYLEPHRINE HYDROCHLORIDE 200 MCG: 10 INJECTION INTRAVENOUS at 11:57

## 2022-07-01 RX ADMIN — Medication 2 G: at 11:24

## 2022-07-01 RX ADMIN — PHENYLEPHRINE HYDROCHLORIDE 100 MCG: 10 INJECTION INTRAVENOUS at 13:09

## 2022-07-01 RX ADMIN — SODIUM CHLORIDE, POTASSIUM CHLORIDE, SODIUM LACTATE AND CALCIUM CHLORIDE: 600; 310; 30; 20 INJECTION, SOLUTION INTRAVENOUS at 10:51

## 2022-07-01 RX ADMIN — DEXAMETHASONE SODIUM PHOSPHATE 8 MG: 4 INJECTION, SOLUTION INTRA-ARTICULAR; INTRALESIONAL; INTRAMUSCULAR; INTRAVENOUS; SOFT TISSUE at 11:30

## 2022-07-01 RX ADMIN — LIDOCAINE HYDROCHLORIDE 0.3 ML: 10 INJECTION, SOLUTION EPIDURAL; INFILTRATION; INTRACAUDAL; PERINEURAL at 10:51

## 2022-07-01 RX ADMIN — ONDANSETRON 4 MG: 2 INJECTION INTRAMUSCULAR; INTRAVENOUS at 11:30

## 2022-07-01 RX ADMIN — GABAPENTIN 300 MG: 300 CAPSULE ORAL at 10:50

## 2022-07-01 RX ADMIN — PHENYLEPHRINE HYDROCHLORIDE 200 MCG: 10 INJECTION INTRAVENOUS at 11:49

## 2022-07-01 RX ADMIN — GLYCOPYRROLATE 0.2 MG: 0.2 INJECTION, SOLUTION INTRAMUSCULAR; INTRAVENOUS at 11:30

## 2022-07-01 NOTE — OP NOTE
PREOPERATIVE DIAGNOSES: Moderate obstructive sleep apnea, intolerance to CPAP.    POSTOPERATIVE DIAGNOSES: Same.     PROCEDURE PERFORMED:   1. Placement of 12th cranial nerve (hypoglossal) stimulation implant  2. Placement of right pleural respiration sensor   3. Electronic analysis of the implanted neurostimulator pulse generator system  4. Hypoglossal nerve monitoring using NIM EMG  5. Microsurgical techniques using the operating microscope    SURGEON: Oscar Ma MD     ASSISTANTS: Erasmo Fu PA-C    BLOOD LOSS: 10 mL.    COMPLICATIONS: None.     SPECIMENS: None.     ANESTHESIA: General.    GRAFTS, IMPLANTS, DRAINS: Inspire Hypoglossal Nerve Stimulation Implant, Hypoglossal Nerve Stimulation Lead, Respiratory Sensor Lead    INDICATIONS: Improve obstructive apnea, treatment.    FINDINGS:   1. Normal anatomic branching pattern of the right hypoglossal nerve.    OPERATIVE TECHNIQUE: The patient was brought to the operating room and identified by name clinic number.  They were placed supinely on the operating room table and general endotracheal anesthesia was induced by the anesthesia service.  The bed was rotated 180 degrees and the patient was prepped and draped in standard fashion.  Prior to prepping and draping, electrodes were placed in the genioglossus and styloglossus muscle for intraoperative nerve monitoring. The EMG hypoglossal nerve monitor was confirmed functional.      A modified sub-mandibular incision was made in the right upper neck approximately 2 cm below the mandible in the natural skin crease. Dissection was carried down through the subcutaneous tissue and platysma. The inferior border of the submandibular gland was identified as well as the digastric tendon. The submandibular gland and the overlying fascia with the marginal mandibular nerve were retracted superiorly. The digastric was retracted inferiorly. Dissection was carried down into the digastric triangle where the hypoglossal nerve  was identified in its usual fashion. The mylohyoid muscle was retracted anteriorally, and the hypoglossal nerve was dissected up towards the floor of the mouth.  The operating microscope was used for microsurgical dissection of the distal hypoglossal nerve branches.  The exclusion branches to retrusor muscles were identified, and tested intra-operatively using the EMG stimulator. The cuff electrode for the hypoglossal nerve stimulator was placed distally to the exclusion branches on the medial nerve branches to the genioglossus muscle. The C1 branch was included in the cuff.  The neck anchor was secured with 3-0 interrupted silk suture.    Attention was then turned to the chest. A 5 cm incision was made in the right upper chest approximately 3 cm below the clavicle and 3 cm lateral from the sternum.  Dissection was carried down to the pectoralis muscle. An inferior pocket was created deep to the subcutaneous layer and superficial to the pectoralis muscle. Dissection was carried down through the medial pectoralis muscle bluntly.  The subpectoral fat pad was identified and swept laterally. The external oblique muscle and fascia were identified inferior to the second rib space. We entered the plane between the external and internal intercostals bluntly. The pleural respiration sensor was placed into the pocket from medial to lateral.  The distal anchor was sutured medially to the external oblique facia using interrupted 3-0 silk sutures.  The pectoralis muscle was then returned to anatomic position and the proximal was secured to the pectoralis major muscle facia using interrupted 3-0 silk sutures.     The stimulation lead was then tunneled in a subplatysmal plane and brought out into the sub-clavicular pocket.  Both the cuff electrode and the respiration sensing lead were connected to the implantable pulse generator. Diagnostic evaluation was performed confirming good respiration sensing signal as well as good tongue  protrusion stimulation.     The implantable pulse generator was placed in the subclavicular pocket and secured loosely to the pectoralis fascia using 2-0 silk interrupted sutures. All the wounds were copiously irrigated with normal saline. Valsalva was performed and hemostasis was assured.  The incisions were then closed with deep interrupted 3-0 Vicryl, dermal 4-0 Monocryl sutures, and Dermabond for skin closure.    This marked the end of the procedure.  The patient was then turned over to anesthesia for recovery where they were awakened, extubated, and transferred to the PACU in excellent condition.  Neck and chest x-rays were performed in PACU to confirm placement and document the absence of a pneumothorax.  There were no complications.  There was minimal blood loss.  All standard operating room protocol and universal precautions were used throughout the procedure. Erasmo Fu PA-C   assisted for the procedure and was required for patient positioning, prepping, draping, surgical retraction, excellent tissue handling, and closure.     Oscar Ma MD  Department of Otolaryngology-Head and Neck Surgery  [facility]

## 2022-07-01 NOTE — DISCHARGE INSTRUCTIONS
Discharge Instructions for Inspire Implant    Recovery - Everyone recovers differently from a general anesthetic.  Symptoms such as fatigue, nausea, lightheadedness, and sometimes a low grade fever (up to 101 degrees) are not unusual.  As your body removes the anesthetic drugs from circulation, these symptoms will resolve.  The incisions will be sore after surgery, and a mild amount of swelling and redness is normal.  Use ice packs as needed.  There are no diet restrictions after placement of the Inspire implant, and you can resume your home medications, except blood thinners such as aspirin or coumadin, which should not be taken for 1-5 days after surgery. Clarify the length of time with your surgeon.  Limit your activity to no strenuous activities until I see you for the first follow up visit, and sleep with your head and neck elevated.  I recommend no heavy lifting greater than 15-20 pounds for the first 14 days following surgery.  You can shower and let the water run over the incision 48 hours after surgery. Do not scrub the incision, but pat it dry when you are finished. Also, do not submerge the incision in a tub until it is entirely healed (approximately 4 weeks following surgery).       Medications - You were sent home with narcotic pain medication.  If you can tolerate the discomfort during your recovery by using Tylenol or Ibuprofen (advil), please do so.  However, do not hesitate to use the stronger pain medication if needed.     Complications - Problems related to Inspire implant placement are usually either due to infection or bleeding.  Signs of infection such as redness, increasing pain, swelling, pus at the incision, and fever should be reported as soon as possible.  If there is a fast growing swelling of the surgical area and difficulty breathing or swallowing (like being choked), this is an emergency.  If it is slow but persistent, be taken to an emergency room.  If you feel like your breathing is  being blocked - Call 911.    Follow up - At your first follow-up, I will examine the incision sites to make sure there are no troubles with healing.  You will also have another appointment with sleep medicine 4 weeks after implant placement to activate the device.      If there are any questions or issues with the above, or if there are other issues that concern you, always feel free to call the clinic and I am happy to speak with you as soon as feasible.    Oscar Ma MD  Department of Otolaryngology-Head and Neck Surgery  [facility]  [phone] or [phone] After hours, [facility] option                              Same Day Surgery Discharge Instructions  Special Precautions After Surgery - Adult    It is not unusual to feel lightheaded or faint, up to 24 hours after surgery or while taking pain medication.  If you have these symptoms; sit for a few minutes before standing and have someone assist you when getting up.  You should rest and relax for the next 24 hours and must have someone stay with you for at least 24 hours after your discharge.  DO NOT DRIVE any vehicle or operate mechanical equipment for 24 hours following the end of your surgery.  DO NOT DRIVE while taking narcotic pain medications that have been prescribed by your physician.  If you had a limb operated on, you must be able to use it fully to drive.  DO NOT drink alcoholic beverages for 24 hours following surgery or while taking prescription pain medication.  Drink clear liquids (apple juice, ginger ale, broth, 7-Up, etc.).  Progress to your regular diet as you feel able.  Any questions call your physician and do not make important decisions for 24 hours.    Nausea and Vomiting: Nausea and vomiting can occur any time after receiving anesthesia. If you experience nausea and vomiting we encourage you to move to a clear liquid diet and advance your diet as tolerated. If nausea and vomiting do not improve within 12  hours please call the surgeon or present to the Emergency department.     Break-through Bleeding: If your experience bleeding from your surgical site apply pressure and additional dressing per nurse instruction. For simple problems such as a saturated dressing, you may need to reinforce the dressing with more gauze and tape and put slight pressure on the site. If bleeding does not subside contact the surgeon or present to the Emergency Department.    Post-op Infection: If you develop a fever of 100.4 or greater, have pus like drainage, redness, swelling or severe pain at the surgical site not alleviated with pain medications; please contact the surgeon or present to the Emergency Department.   __________________________________________________________________________________________________________________________________  IMPORTANT NUMBERS:    [facility] [place]:  [phone], [phone]  Pharmacy:  [phone]  Same Day Surgery:  [phone], Monday - Thursday until 8:30 p.m., Fridays until 6:00 p.m.  Urgent Care:  [phone]  Nurse Advice Line:  [phone]

## 2022-07-01 NOTE — ANESTHESIA CARE TRANSFER NOTE
Patient: Don Deluca    Procedure: Procedure(s):  INSERTION, PULSE GENERATOR, NEUROSTIMULATOR       Diagnosis: Moderate obstructive sleep apnea [G47.33]  Intolerance of continuous positive airway pressure (CPAP) ventilation [Z78.9]  BMI 25.0-25.9,adult [Z68.25]  Diagnosis Additional Information: No value filed.    Anesthesia Type:   General     Note:    Oropharynx: oropharynx clear of all foreign objects and spontaneously breathing  Level of Consciousness: awake  Oxygen Supplementation: face mask  Level of Supplemental Oxygen (L/min / FiO2): 5  Independent Airway: airway patency satisfactory and stable  Dentition: dentition unchanged  Vital Signs Stable: post-procedure vital signs reviewed and stable  Report to RN Given: handoff report given  Patient transferred to: PACU    Handoff Report: Identifed the Patient, Identified the Reponsible Provider, Reviewed the pertinent medical history, Discussed the surgical course, Reviewed Intra-OP anesthesia mangement and issues during anesthesia, Set expectations for post-procedure period and Allowed opportunity for questions and acknowledgement of understanding      Vitals:  Vitals Value Taken Time   BP     Temp     Pulse     Resp     SpO2         Electronically Signed By: Charles Butler CRNA, APRN CRNA  July 1, 2022  1:43 PM

## 2022-07-01 NOTE — ANESTHESIA PREPROCEDURE EVALUATION
Anesthesia Pre-Procedure Evaluation    Patient: Don Deluca   MRN: [id number] : 1965        Procedure : Procedure(s):  INSERTION, PULSE GENERATOR, NEUROSTIMULATOR          History reviewed. No pertinent past medical history.   Past Surgical History:   Procedure Laterality Date     EXAM UNDER ANESTHESIA NOSE N/A 2022    Procedure: DRUG INDUCED SLEEP ENDOSCOPY;  Surgeon: Oscar Ma MD;  Location: [facility]      No Known Allergies   Social History     Tobacco Use     Smoking status: Never Smoker     Smokeless tobacco: Former User   Substance Use Topics     Alcohol use: Not on file      Wt Readings from Last 1 Encounters:   22 81.6 kg (180 lb)        Anesthesia Evaluation   Pt has had prior anesthetic. Type: General.    No history of anesthetic complications       ROS/MED HX  ENT/Pulmonary:     (+) sleep apnea, uses CPAP,     Neurologic:  - neg neurologic ROS     Cardiovascular:  - neg cardiovascular ROS   (+) Dyslipidemia hypertension-----Previous cardiac testing   Echo: Date: 22 Results:  WNL  Stress Test: Date: Results:    ECG Reviewed: Date: Results:    Cath: Date: Results:      METS/Exercise Tolerance: >4 METS    Hematologic:  - neg hematologic  ROS     Musculoskeletal:  - neg musculoskeletal ROS     GI/Hepatic: Comment: Hx etoh abuse in remission      Renal/Genitourinary:     (+) renal disease,     Endo:       Psychiatric/Substance Use:     (+) psychiatric history anxiety and depression     Infectious Disease:  - neg infectious disease ROS     Malignancy:  - neg malignancy ROS     Other:  - neg other ROS          Physical Exam    Airway  airway exam normal      Mallampati: II   TM distance: > 3 FB   Neck ROM: full   Mouth opening: > 3 cm    Respiratory Devices and Support         Dental  no notable dental history         Cardiovascular   cardiovascular exam normal       Rhythm and rate: regular and normal     Pulmonary   pulmonary exam normal        breath sounds clear to auscultation            OUTSIDE LABS:  CBC:   Lab Results   Component Value Date    WBC 5.2 07/18/2017    HGB 14.3 07/18/2017    HCT 41.9 07/18/2017     07/18/2017     BMP:   Lab Results   Component Value Date     07/18/2017    POTASSIUM 3.7 07/18/2017    CHLORIDE 104 07/18/2017    CO2 29 07/18/2017    BUN 18 07/18/2017    CR 1.42 (H) 07/18/2017     (H) 07/18/2017     COAGS:   Lab Results   Component Value Date    PTT 26 07/18/2017    INR 0.88 07/18/2017     POC:   Lab Results   Component Value Date     (H) 07/18/2017     HEPATIC:   Lab Results   Component Value Date    ALBUMIN 3.8 07/18/2017    PROTTOTAL 7.5 07/18/2017    ALT 25 07/18/2017    AST 17 07/18/2017    ALKPHOS 97 07/18/2017    BILITOTAL 0.3 07/18/2017     OTHER:   Lab Results   Component Value Date    PHU 8.7 07/18/2017    TSH 1.03 07/18/2017       Anesthesia Plan    ASA Status:  3   NPO Status:  NPO Appropriate    Anesthesia Type: General.     - Airway: ETT   Induction: Propofol, Intravenous.   Maintenance: Balanced.        Consents    Anesthesia Plan(s) and associated risks, benefits, and realistic alternatives discussed. Questions answered and patient/representative(s) expressed understanding.     - Discussed: Risks, Benefits and Alternatives for BOTH SEDATION and the PROCEDURE were discussed     - Discussed with:  Patient      - Extended Intubation/Ventilatory Support Discussed: No.      - Patient is DNR/DNI Status: No    Use of blood products discussed: No .     Postoperative Care    Pain management: Oral pain medications, IV analgesics, Multi-modal analgesia.   PONV prophylaxis: Ondansetron (or other 5HT-3), Dexamethasone or Solumedrol     Comments:                Charles Butler, CRNA, APRN CRNA

## 2022-07-01 NOTE — ANESTHESIA POSTPROCEDURE EVALUATION
Patient: Don Deluca    Procedure: Procedure(s):  INSERTION, PULSE GENERATOR, NEUROSTIMULATOR       Anesthesia Type:  General    Note:  Disposition: Outpatient   Postop Pain Control: Uneventful            Sign Out: Well controlled pain   PONV: No   Neuro/Psych: Uneventful            Sign Out: Acceptable/Baseline neuro status   Airway/Respiratory: Uneventful            Sign Out: Acceptable/Baseline resp. status   CV/Hemodynamics: Uneventful            Sign Out: Acceptable CV status; No obvious hypovolemia; No obvious fluid overload   Other NRE: NONE   DID A NON-ROUTINE EVENT OCCUR? No           Last vitals:  Vitals Value Taken Time   BP 96/54 07/01/22 1433   Temp 36.8  C (98.3  F) 07/01/22 1429   Pulse 86 07/01/22 1436   Resp 9 07/01/22 1428   SpO2 98 % 07/01/22 1440   Vitals shown include unvalidated device data.    Electronically Signed By: Charles Butler CRNA, APRN CRNA  July 1, 2022  2:44 PM

## 2022-07-06 ENCOUNTER — OFFICE VISIT (OUTPATIENT)
Dept: OTOLARYNGOLOGY | Facility: CLINIC | Age: 57
End: 2022-07-06
Payer: COMMERCIAL

## 2022-07-06 VITALS
WEIGHT: 180 LBS | HEIGHT: 70 IN | TEMPERATURE: 97.8 F | DIASTOLIC BLOOD PRESSURE: 64 MMHG | BODY MASS INDEX: 25.77 KG/M2 | HEART RATE: 44 BPM | SYSTOLIC BLOOD PRESSURE: 101 MMHG

## 2022-07-06 DIAGNOSIS — Z98.890 POSTOPERATIVE STATE: ICD-10-CM

## 2022-07-06 DIAGNOSIS — G47.33 MODERATE OBSTRUCTIVE SLEEP APNEA: Primary | ICD-10-CM

## 2022-07-06 DIAGNOSIS — Z96.82 STATUS POST INSERTION OF NERVE STIMULATOR: ICD-10-CM

## 2022-07-06 DIAGNOSIS — Z78.9 INTOLERANCE OF CONTINUOUS POSITIVE AIRWAY PRESSURE (CPAP) VENTILATION: ICD-10-CM

## 2022-07-06 PROCEDURE — 99024 POSTOP FOLLOW-UP VISIT: CPT | Performed by: OTOLARYNGOLOGY

## 2022-07-06 NOTE — NURSING NOTE
"Initial /64 (BP Location: Right arm, Patient Position: Sitting, Cuff Size: Adult Regular)   Pulse (!) 44   Temp 97.8  F (36.6  C) (Tympanic)   Ht 1.778 m (5' 10\")   Wt 81.6 kg (180 lb)   BMI 25.83 kg/m   Estimated body mass index is 25.83 kg/m  as calculated from the following:    Height as of this encounter: 1.778 m (5' 10\").    Weight as of this encounter: 81.6 kg (180 lb). .    Micaela Carias CMA    "

## 2022-07-06 NOTE — LETTER
7/6/2022         RE: Don Deluca  [address] [facility] [place] [address]        Dear Colleague,    Thank you for referring your patient, Don Deluca, to the [facility]. Please see a copy of my visit note below.    Chief Complaint   Patient presents with     Post-op Visit     Post op Inspire- 1 week      History of Present Illness  Don Deluca is a 57 year old male who presents today for follow-up.  The patient went to the operating room on 7/1/2022 and underwent placement of the hypoglossal nerve stimulator.  The patient has done well postoperatively with minimal pain. The patient denies any problems with the incision.  The patient presents today for follow-up.     Past Medical History  Patient Active Problem List   Diagnosis     CKD (chronic kidney disease) stage 3, GFR 30-59 ml/min (H)     Obstructive sleep apnea     Colloid thyroid nodule     Bradycardia     Bruxism     Hypertension     Generalized anxiety disorder     Current Medications    Current Outpatient Medications:      acetaminophen (TYLENOL) 500 MG tablet, Take 2 tablets (1,000 mg) by mouth every 6 hours as needed for pain or fever Every 6 hours as needed for post-op pain.  Alternate with ibuprofen. (Patient taking differently: Take 1,000 mg by mouth every 6 hours as needed for pain or fever Every 6 hours as needed for post-op pain.  Alternate with ibuprofen.), Disp: 200 tablet, Rfl: 1     atorvastatin (LIPITOR) 40 MG tablet, Take 40 mg by mouth daily, Disp: , Rfl:      busPIRone (BUSPAR) 10 MG tablet, Take 15 mg by mouth daily, Disp: , Rfl:      ibuprofen (ADVIL/MOTRIN) 200 MG tablet, Take 2 tablets (400 mg) by mouth every 6 hours as needed for moderate pain, Disp: 200 tablet, Rfl: 1     lisinopril (ZESTRIL) 20 MG tablet, Take 10 mg by mouth daily, Disp: , Rfl:      oxyCODONE (ROXICODONE) 5 MG tablet, Take 1 tablet (5 mg) by mouth every 4 hours as needed for severe pain or breakthrough pain, Disp: 15 tablet,  "Rfl: 0     SENNA-docusate sodium (SENNA S) 8.6-50 MG tablet, Take 1 tablet by mouth At Bedtime Use while taking narcotic pain medications.  Hold for diarrhea., Disp: 30 tablet, Rfl: 1     SERTRALINE HCL PO, Take 150 mg by mouth every evening, Disp: , Rfl:     Allergies  No Known Allergies    Social History  Social History     Socioeconomic History     Marital status: Single   Tobacco Use     Smoking status: Never Smoker     Smokeless tobacco: Former User       Family History  History reviewed. No pertinent family history.    Review of Systems  As per HPI and PMHx, otherwise 10 system review including the head and neck, constitutional, eyes, respiratory, GI, skin, neurologic, lymphatic, endocrine, and allergy systems is negative.    Physical Exam  /64 (BP Location: Right arm, Patient Position: Sitting, Cuff Size: Adult Regular)   Pulse (!) 44   Temp 97.8  F (36.6  C) (Tympanic)   Ht 1.778 m (5' 10\")   Wt 81.6 kg (180 lb)   BMI 25.83 kg/m    GENERAL: Patient is a pleasant, cooperative 57 year old male in no acute distress.  HEAD: Normocephalic, atraumatic.  Hair and scalp are normal.  EYES: Pupils are equal, round, reactive to light and accommodation.  Extraocular movements are intact.  The sclera nonicteric without injection.  The extraocular structures are normal.  EARS: Normal shape and symmetry.  No tenderness when palpating the mastoid or tragal areas bilaterally.  Otoscopic exam reveals a minimal amount of cerumen bilaterally.  The bilateral tympanic membranes are round, intact without evidence of effusion, good landmarks.  No retraction, granulation, or drainage.  NOSE: Nares are patent.  Nasal mucosa is pink and moist.  Negative anterior rhinoscopy.  ORAL CAVITY: Dentition is in good repair.  Mucous membranes are moist.  Tongue is mobile, protrudes to the midline.  Palate elevates symmetrically.  NECK: Supple, trachea is midline.  The right submental incision is clean, dry, intact.  No erythema or " fluctuance.  No evidence of hematoma or seroma.    CHEST: The right upper chest incision is clean, dry, intact.  No evidence of hematoma or seroma.  No erythema or fluctuance.  NEUROLOGIC: Cranial nerves II through XII are grossly intact.  Voice is strong.  Patient is House-Brackmann I/VI on the right and House-Brackmann I/VI on the left.  CARDIOVASCULAR: Extremities are warm and well-perfused.  No significant peripheral edema.  RESPIRATORY: Patient has nonlabored breathing without cough, wheeze, stridor.  PSYCHIATRIC: Patient is alert and oriented.  Mood and affect appear normal.  SKIN: Warm and dry.  No scalp, face, or neck lesions noted.    Assessment and Plan     ICD-10-CM    1. Moderate obstructive sleep apnea  G47.33    2. Intolerance of continuous positive airway pressure (CPAP) ventilation  Z78.9    3. BMI 25.0-25.9,adult  Z68.25    4. Status post insertion of nerve stimulator  Z96.82    5. Postoperative state  Z98.890       It was my pleasure seeing Don Deluca today in clinic.  The patient is healing well after placement of the hypoglossal nerve stimulator.  We discussed lifting and activity restrictions.  We discussed incision care and follow-up. I would like to see the patient back in 3 weeks for a recheck.  The patient will need to meet with sleep medicine ( Friday) for activation 1 month after placement of the device. The paient knows to contact me sooner with any problems or concerns.    Oscar Ma MD  Department of Otolaryngology-Head and Neck Surgery  [facility]         Again, thank you for allowing me to participate in the care of your patient.        Sincerely,        Oscar Ma MD

## 2022-07-06 NOTE — PATIENT INSTRUCTIONS
Per physician instructions      If you have questions or concerns on any instructions given to you by your provider today or if you need to schedule an appointment, you can reach us at [phone].

## 2022-07-19 NOTE — PROGRESS NOTES
"Chief Complaint   Patient presents with     Post-op Visit     Post op Inspire #2- doing well     History of Present Illness  Don Deluca is a 57 year old male who presents today for follow-up.  The patient went to the operating room on 7/1/2022 and underwent placement of the hypoglossal nerve stimulator.  The patient was seen on 7/6/2022 and he was doing well. He has done well postoperatively with minimal pain. The patient denies any problems with the incisions.  The patient presents today for follow-up.     Past Medical History  Patient Active Problem List   Diagnosis     CKD (chronic kidney disease) stage 3, GFR 30-59 ml/min (H)     Obstructive sleep apnea     Colloid thyroid nodule     Bradycardia     Bruxism     Hypertension     Generalized anxiety disorder     Current Medications    Current Outpatient Medications:      atorvastatin (LIPITOR) 40 MG tablet, Take 40 mg by mouth daily, Disp: , Rfl:      busPIRone (BUSPAR) 10 MG tablet, Take 15 mg by mouth daily, Disp: , Rfl:      lisinopril (ZESTRIL) 20 MG tablet, Take 10 mg by mouth daily, Disp: , Rfl:      SERTRALINE HCL PO, Take 150 mg by mouth every evening, Disp: , Rfl:     Allergies  No Known Allergies    Social History  Social History     Socioeconomic History     Marital status: Single   Tobacco Use     Smoking status: Never Smoker     Smokeless tobacco: Former User       Family History  History reviewed. No pertinent family history.    Review of Systems  As per HPI and PMHx, otherwise 10 system review including the head and neck, constitutional, eyes, respiratory, GI, skin, neurologic, lymphatic, endocrine, and allergy systems is negative.    Physical Exam  /73 (BP Location: Left arm, Patient Position: Sitting, Cuff Size: Adult Regular)   Pulse (!) 47   Temp 97.7  F (36.5  C) (Tympanic)   Ht 1.778 m (5' 10\")   Wt 79.4 kg (175 lb)   BMI 25.11 kg/m    GENERAL: Patient is a pleasant, cooperative 57 year old male in no acute distress.  HEAD: " Normocephalic, atraumatic.  Hair and scalp are normal.  EYES: Pupils are equal, round, reactive to light and accommodation.  Extraocular movements are intact.  The sclera nonicteric without injection.  The extraocular structures are normal.  EARS: Normal shape and symmetry.  No tenderness when palpating the mastoid or tragal areas bilaterally.     NOSE: Nares are patent.  Nasal mucosa is moist.  Negative anterior rhinoscopy.  ORAL CAVITY: Dentition is in good repair.  Mucous membranes are moist.  Tongue is mobile, protrudes to the midline.  Palate elevates symmetrically.  NECK: Supple, trachea is midline.  The right submental incision is clean, dry, intact.  No erythema or fluctuance.  No evidence of hematoma or seroma.    CHEST: The right upper chest incision is clean, dry, intact.  No evidence of hematoma or seroma.  No erythema or fluctuance.  There is a small stitch at the right lateral portion of the incision that was extruding that I removed.  NEUROLOGIC: Cranial nerves II through XII are grossly intact.  Voice is strong.  Patient is House-Brackmann I/VI on the right and House-Brackmann I/VI on the left.  CARDIOVASCULAR: Extremities are warm and well-perfused.  No significant peripheral edema.  RESPIRATORY: Patient has nonlabored breathing without cough, wheeze, stridor.  PSYCHIATRIC: Patient is alert and oriented.  Mood and affect appear normal.  SKIN: Warm and dry.  No scalp, face, or neck lesions noted.    Assessment and Plan     ICD-10-CM    1. Moderate obstructive sleep apnea  G47.33    2. Intolerance of continuous positive airway pressure (CPAP) ventilation  Z78.9    3. BMI 25.0-25.9,adult  Z68.25    4. Status post insertion of nerve stimulator  Z96.82    5. Postoperative state  Z98.890       It was my pleasure seeing Don Deluca today in clinic. The patient is healing well after placement of the hypoglossal nerve stimulator.  We discussed stopping the lifting and activity restrictions.  We  discussed incision care and follow-up. The patient is cleared to meet with sleep medicine ( Friday) for activation.  The paient knows to contact me in the future with any problems or concerns.    Oscar Ma MD  Department of Otolaryngology-Head and Neck Surgery  [facility]

## 2022-08-03 ENCOUNTER — OFFICE VISIT (OUTPATIENT)
Dept: OTOLARYNGOLOGY | Facility: CLINIC | Age: 57
End: 2022-08-03
Payer: COMMERCIAL

## 2022-08-03 VITALS
HEIGHT: 70 IN | TEMPERATURE: 97.7 F | SYSTOLIC BLOOD PRESSURE: 113 MMHG | WEIGHT: 175 LBS | BODY MASS INDEX: 25.05 KG/M2 | DIASTOLIC BLOOD PRESSURE: 73 MMHG | HEART RATE: 47 BPM

## 2022-08-03 DIAGNOSIS — Z96.82 STATUS POST INSERTION OF NERVE STIMULATOR: ICD-10-CM

## 2022-08-03 DIAGNOSIS — Z98.890 POSTOPERATIVE STATE: ICD-10-CM

## 2022-08-03 DIAGNOSIS — G47.33 MODERATE OBSTRUCTIVE SLEEP APNEA: Primary | ICD-10-CM

## 2022-08-03 DIAGNOSIS — Z78.9 INTOLERANCE OF CONTINUOUS POSITIVE AIRWAY PRESSURE (CPAP) VENTILATION: ICD-10-CM

## 2022-08-03 PROCEDURE — 99024 POSTOP FOLLOW-UP VISIT: CPT | Performed by: OTOLARYNGOLOGY

## 2022-08-03 NOTE — PATIENT INSTRUCTIONS
Per physician instructions      If you have questions or concerns on any instructions given to you by your provider today or if you need to schedule an appointment, you can reach us at [phone].

## 2022-08-03 NOTE — LETTER
8/3/2022         RE: Don Deluca  [address] [facility] [address]        Dear Colleague,    Thank you for referring your patient, Don Deluca, to the [facility]. Please see a copy of my visit note below.    Chief Complaint   Patient presents with     Post-op Visit     Post op Inspire #2- doing well     History of Present Illness  Don Deluca is a 57 year old male who presents today for follow-up.  The patient went to the operating room on 7/1/2022 and underwent placement of the hypoglossal nerve stimulator.  The patient was seen on 7/6/2022 and he was doing well. He has done well postoperatively with minimal pain. The patient denies any problems with the incisions.  The patient presents today for follow-up.     Past Medical History  Patient Active Problem List   Diagnosis     CKD (chronic kidney disease) stage 3, GFR 30-59 ml/min (H)     Obstructive sleep apnea     Colloid thyroid nodule     Bradycardia     Bruxism     Hypertension     Generalized anxiety disorder     Current Medications    Current Outpatient Medications:      atorvastatin (LIPITOR) 40 MG tablet, Take 40 mg by mouth daily, Disp: , Rfl:      busPIRone (BUSPAR) 10 MG tablet, Take 15 mg by mouth daily, Disp: , Rfl:      lisinopril (ZESTRIL) 20 MG tablet, Take 10 mg by mouth daily, Disp: , Rfl:      SERTRALINE HCL PO, Take 150 mg by mouth every evening, Disp: , Rfl:     Allergies  No Known Allergies    Social History  Social History     Socioeconomic History     Marital status: Single   Tobacco Use     Smoking status: Never Smoker     Smokeless tobacco: Former User       Family History  History reviewed. No pertinent family history.    Review of Systems  As per HPI and PMHx, otherwise 10 system review including the head and neck, constitutional, eyes, respiratory, GI, skin, neurologic, lymphatic, endocrine, and allergy systems is negative.    Physical Exam  /73 (BP Location: Left arm, Patient  "Position: Sitting, Cuff Size: Adult Regular)   Pulse (!) 47   Temp 97.7  F (36.5  C) (Tympanic)   Ht 1.778 m (5' 10\")   Wt 79.4 kg (175 lb)   BMI 25.11 kg/m    GENERAL: Patient is a pleasant, cooperative 57 year old male in no acute distress.  HEAD: Normocephalic, atraumatic.  Hair and scalp are normal.  EYES: Pupils are equal, round, reactive to light and accommodation.  Extraocular movements are intact.  The sclera nonicteric without injection.  The extraocular structures are normal.  EARS: Normal shape and symmetry.  No tenderness when palpating the mastoid or tragal areas bilaterally.     NOSE: Nares are patent.  Nasal mucosa is moist.  Negative anterior rhinoscopy.  ORAL CAVITY: Dentition is in good repair.  Mucous membranes are moist.  Tongue is mobile, protrudes to the midline.  Palate elevates symmetrically.  NECK: Supple, trachea is midline.  The right submental incision is clean, dry, intact.  No erythema or fluctuance.  No evidence of hematoma or seroma.    CHEST: The right upper chest incision is clean, dry, intact.  No evidence of hematoma or seroma.  No erythema or fluctuance.  There is a small stitch at the right lateral portion of the incision that was extruding that I removed.  NEUROLOGIC: Cranial nerves II through XII are grossly intact.  Voice is strong.  Patient is House-Brackmann I/VI on the right and House-Brackmann I/VI on the left.  CARDIOVASCULAR: Extremities are warm and well-perfused.  No significant peripheral edema.  RESPIRATORY: Patient has nonlabored breathing without cough, wheeze, stridor.  PSYCHIATRIC: Patient is alert and oriented.  Mood and affect appear normal.  SKIN: Warm and dry.  No scalp, face, or neck lesions noted.    Assessment and Plan     ICD-10-CM    1. Moderate obstructive sleep apnea  G47.33    2. Intolerance of continuous positive airway pressure (CPAP) ventilation  Z78.9    3. BMI 25.0-25.9,adult  Z68.25    4. Status post insertion of nerve stimulator  Z96.82  "   5. Postoperative state  Z98.890       It was my pleasure seeing Don Deluca today in clinic. The patient is healing well after placement of the hypoglossal nerve stimulator.  We discussed stopping the lifting and activity restrictions.  We discussed incision care and follow-up. The patient is cleared to meet with sleep medicine ( Friday) for activation.  The paient knows to contact me in the future with any problems or concerns.    Oscar Ma MD  Department of Otolaryngology-Head and Neck Surgery  [facility]       Again, thank you for allowing me to participate in the care of your patient.        Sincerely,        Oscar Ma MD

## 2022-08-03 NOTE — NURSING NOTE
"Initial /73 (BP Location: Left arm, Patient Position: Sitting, Cuff Size: Adult Regular)   Pulse 92   Temp 97.7  F (36.5  C) (Tympanic)   Ht 1.778 m (5' 10\")   Wt 79.4 kg (175 lb)   BMI 25.11 kg/m   Estimated body mass index is 25.11 kg/m  as calculated from the following:    Height as of this encounter: 1.778 m (5' 10\").    Weight as of this encounter: 79.4 kg (175 lb). .    Micaela Carias CMA    "

## 2023-12-27 NOTE — INTERVAL H&P NOTE
"I have reviewed the surgical (or preoperative) H&P that is linked to this encounter, and examined the patient. There are no significant changes    Clinical Conditions Present on Arrival:  Clinically Significant Risk Factors Present on Admission                   # Overweight: Estimated body mass index is 25.83 kg/m  as calculated from the following:    Height as of this encounter: 1.778 m (5' 10\").    Weight as of this encounter: 81.6 kg (180 lb).       " Okay please let her know the order was placed however sometimes insurance will make us do an x-ray or something like that first we will see what they say.  I ordered it as a stat order

## (undated) DEVICE — GLOVE PROTEXIS W/NEU-THERA 6.5  2D73TE65

## (undated) DEVICE — DRAPE MICRO ZEISS MD 48"X120CM

## (undated) DEVICE — DRAPE STERI TOWEL SM 1000

## (undated) DEVICE — SPONGE KITTNER 31001010

## (undated) DEVICE — SYR 10ML FINGER CONTROL W/O NDL 309695

## (undated) DEVICE — SOL WATER IRRIG 1000ML BOTTLE 2F7114

## (undated) DEVICE — SU SILK 2-0 SH 30" K833H

## (undated) DEVICE — BLADE LARYNGOSCOPE AMBU ASCOPE 4 510-001-000

## (undated) DEVICE — IOM STANDARD SUPPLY FEE

## (undated) DEVICE — SU SILK 3-0 TIE 18" SA64H

## (undated) DEVICE — ESU CORD BIPOLAR GREEN 10-4000

## (undated) DEVICE — NDL 25GA 1.5" 305127

## (undated) DEVICE — GLOVE PROTEXIS BLUE W/NEU-THERA 6.5  2D73EB65

## (undated) DEVICE — SU MONOCRYL 4-0 PS-2 18" UND Y496G

## (undated) DEVICE — PACK LAPAROTOMY CUSTOM LAKES

## (undated) DEVICE — ESU ELEC BLADE 2.75" COATED/INSULATED E1455

## (undated) DEVICE — CONTROL APPLIANCE SLEEP REMOTE INSPIRE 2580

## (undated) DEVICE — SU VICRYL 3-0 SH 27" UND J416H

## (undated) DEVICE — COVER NEOPROBE SOFTFLEX 5X96" W/BANDS 20-PC596

## (undated) DEVICE — VESSEL LOOPS RED MINI 31145710

## (undated) DEVICE — GLOVE PROTEXIS W/NEU-THERA 7.5  2D73TE75

## (undated) DEVICE — DRAPE U SPLIT 74X120" 29440

## (undated) DEVICE — TUBING SUCTION 12"X1/4" N612

## (undated) DEVICE — TONGUE DEPRESSOR STERILE 25-705-ALL

## (undated) DEVICE — Device

## (undated) DEVICE — DRAPE IOBAN LG .375X23.5" 6648EZ

## (undated) DEVICE — PREP SKIN SCRUB TRAY 4461A

## (undated) DEVICE — GOWN LG DISP 9515

## (undated) DEVICE — ADH SKIN CLOSURE PREMIERPRO EXOFIN 1.0ML 3470

## (undated) DEVICE — BLADE CLIPPER 4406

## (undated) DEVICE — VESSEL LOOPS BLUE MAXI

## (undated) DEVICE — BLADE KNIFE SURG 15 371115

## (undated) DEVICE — IOM CASE FLAT FEE

## (undated) DEVICE — TUNNELING TOOL AND OBTURATOR

## (undated) DEVICE — SOL NACL 0.9% IRRIG 1000ML BOTTLE 2F7124

## (undated) DEVICE — DRAPE POUCH INSTRUMENT 3 POCKET 1018L

## (undated) DEVICE — GOWN XLG DISP 9545

## (undated) DEVICE — PREP PAD ALCOHOL 6818

## (undated) DEVICE — NDL ANGIOCATH 20GA 1.25" 4056

## (undated) DEVICE — SU SILK 3-0 SH 30" K832H

## (undated) RX ORDER — LIDOCAINE HYDROCHLORIDE 10 MG/ML
INJECTION, SOLUTION EPIDURAL; INFILTRATION; INTRACAUDAL; PERINEURAL
Status: DISPENSED
Start: 2022-07-01

## (undated) RX ORDER — ONDANSETRON 2 MG/ML
INJECTION INTRAMUSCULAR; INTRAVENOUS
Status: DISPENSED
Start: 2022-07-01

## (undated) RX ORDER — FENTANYL CITRATE-0.9 % NACL/PF 10 MCG/ML
PLASTIC BAG, INJECTION (ML) INTRAVENOUS
Status: DISPENSED
Start: 2022-07-01

## (undated) RX ORDER — LIDOCAINE HYDROCHLORIDE AND EPINEPHRINE 10; 10 MG/ML; UG/ML
INJECTION, SOLUTION INFILTRATION; PERINEURAL
Status: DISPENSED
Start: 2022-07-01

## (undated) RX ORDER — PROPOFOL 10 MG/ML
INJECTION, EMULSION INTRAVENOUS
Status: DISPENSED
Start: 2022-07-01

## (undated) RX ORDER — ACETAMINOPHEN 325 MG/1
TABLET ORAL
Status: DISPENSED
Start: 2022-07-01

## (undated) RX ORDER — HYDROXYZINE HYDROCHLORIDE 50 MG/1
TABLET, FILM COATED ORAL
Status: DISPENSED
Start: 2022-07-01

## (undated) RX ORDER — DEXAMETHASONE SODIUM PHOSPHATE 4 MG/ML
INJECTION, SOLUTION INTRA-ARTICULAR; INTRALESIONAL; INTRAMUSCULAR; INTRAVENOUS; SOFT TISSUE
Status: DISPENSED
Start: 2022-07-01

## (undated) RX ORDER — OXYCODONE HYDROCHLORIDE 5 MG/1
TABLET ORAL
Status: DISPENSED
Start: 2022-07-01

## (undated) RX ORDER — FENTANYL CITRATE 50 UG/ML
INJECTION, SOLUTION INTRAMUSCULAR; INTRAVENOUS
Status: DISPENSED
Start: 2022-07-01

## (undated) RX ORDER — CEFAZOLIN SODIUM/WATER 2 G/20 ML
SYRINGE (ML) INTRAVENOUS
Status: DISPENSED
Start: 2022-07-01

## (undated) RX ORDER — GLYCOPYRROLATE 0.2 MG/ML
INJECTION, SOLUTION INTRAMUSCULAR; INTRAVENOUS
Status: DISPENSED
Start: 2022-07-01

## (undated) RX ORDER — GABAPENTIN 300 MG/1
CAPSULE ORAL
Status: DISPENSED
Start: 2022-07-01

## (undated) RX ORDER — EPHEDRINE SULFATE 50 MG/ML
INJECTION, SOLUTION INTRAMUSCULAR; INTRAVENOUS; SUBCUTANEOUS
Status: DISPENSED
Start: 2022-07-01